# Patient Record
Sex: MALE
[De-identification: names, ages, dates, MRNs, and addresses within clinical notes are randomized per-mention and may not be internally consistent; named-entity substitution may affect disease eponyms.]

---

## 2017-05-18 ENCOUNTER — APPOINTMENT (OUTPATIENT)
Dept: FAMILY MEDICINE | Facility: CLINIC | Age: 27
End: 2017-05-18

## 2017-05-18 VITALS
TEMPERATURE: 98.1 F | WEIGHT: 206 LBS | HEART RATE: 51 BPM | DIASTOLIC BLOOD PRESSURE: 71 MMHG | OXYGEN SATURATION: 96 % | SYSTOLIC BLOOD PRESSURE: 134 MMHG

## 2017-05-19 LAB
25(OH)D3 SERPL-MCNC: 32.9 NG/ML
ALBUMIN SERPL ELPH-MCNC: 4.6 G/DL
ALP BLD-CCNC: 86 U/L
ALT SERPL-CCNC: 28 U/L
ANION GAP SERPL CALC-SCNC: 18 MMOL/L
APPEARANCE: CLEAR
AST SERPL-CCNC: 33 U/L
BACTERIA: NEGATIVE
BASOPHILS # BLD AUTO: 0.01 K/UL
BASOPHILS NFR BLD AUTO: 0.1 %
BILIRUB SERPL-MCNC: 0.4 MG/DL
BILIRUBIN URINE: NEGATIVE
BLOOD URINE: NEGATIVE
BUN SERPL-MCNC: 19 MG/DL
CALCIUM SERPL-MCNC: 9.3 MG/DL
CHLORIDE SERPL-SCNC: 101 MMOL/L
CHOLEST SERPL-MCNC: 138 MG/DL
CHOLEST/HDLC SERPL: 3.4 RATIO
CO2 SERPL-SCNC: 22 MMOL/L
COLOR: YELLOW
CREAT SERPL-MCNC: 1.14 MG/DL
EOSINOPHIL # BLD AUTO: 0.09 K/UL
EOSINOPHIL NFR BLD AUTO: 1 %
GLUCOSE QUALITATIVE U: NORMAL MG/DL
GLUCOSE SERPL-MCNC: 91 MG/DL
HCT VFR BLD CALC: 47.3 %
HDLC SERPL-MCNC: 41 MG/DL
HGB BLD-MCNC: 15.3 G/DL
HYALINE CASTS: 0 /LPF
IMM GRANULOCYTES NFR BLD AUTO: 0.1 %
KETONES URINE: NEGATIVE
LDLC SERPL CALC-MCNC: 85 MG/DL
LEUKOCYTE ESTERASE URINE: NEGATIVE
LYMPHOCYTES # BLD AUTO: 1.91 K/UL
LYMPHOCYTES NFR BLD AUTO: 20.9 %
MAN DIFF?: NORMAL
MCHC RBC-ENTMCNC: 31.4 PG
MCHC RBC-ENTMCNC: 32.3 GM/DL
MCV RBC AUTO: 96.9 FL
MICROSCOPIC-UA: NORMAL
MONOCYTES # BLD AUTO: 0.53 K/UL
MONOCYTES NFR BLD AUTO: 5.8 %
NEUTROPHILS # BLD AUTO: 6.59 K/UL
NEUTROPHILS NFR BLD AUTO: 72.1 %
NITRITE URINE: NEGATIVE
PH URINE: 8.5
PLATELET # BLD AUTO: 302 K/UL
POTASSIUM SERPL-SCNC: 4.5 MMOL/L
PROT SERPL-MCNC: 7.2 G/DL
PROTEIN URINE: ABNORMAL MG/DL
RBC # BLD: 4.88 M/UL
RBC # FLD: 13.6 %
RED BLOOD CELLS URINE: 2 /HPF
SODIUM SERPL-SCNC: 141 MMOL/L
SPECIFIC GRAVITY URINE: 1.03
SQUAMOUS EPITHELIAL CELLS: 1 /HPF
TRIGL SERPL-MCNC: 59 MG/DL
TSH SERPL-ACNC: 2.02 UIU/ML
UROBILINOGEN URINE: NORMAL MG/DL
WBC # FLD AUTO: 9.14 K/UL
WHITE BLOOD CELLS URINE: 0 /HPF

## 2017-05-22 LAB — TESTOST SERPL-MCNC: 218.5 NG/DL

## 2017-06-22 ENCOUNTER — RESULT CHARGE (OUTPATIENT)
Age: 27
End: 2017-06-22

## 2017-06-23 ENCOUNTER — APPOINTMENT (OUTPATIENT)
Dept: FAMILY MEDICINE | Facility: CLINIC | Age: 27
End: 2017-06-23

## 2017-06-23 VITALS
TEMPERATURE: 97.6 F | BODY MASS INDEX: 29.47 KG/M2 | OXYGEN SATURATION: 97 % | DIASTOLIC BLOOD PRESSURE: 67 MMHG | HEIGHT: 69 IN | HEART RATE: 60 BPM | WEIGHT: 199 LBS | SYSTOLIC BLOOD PRESSURE: 126 MMHG

## 2017-10-02 ENCOUNTER — APPOINTMENT (OUTPATIENT)
Dept: FAMILY MEDICINE | Facility: CLINIC | Age: 27
End: 2017-10-02
Payer: COMMERCIAL

## 2017-10-02 VITALS
OXYGEN SATURATION: 96 % | HEIGHT: 69 IN | SYSTOLIC BLOOD PRESSURE: 125 MMHG | WEIGHT: 196 LBS | HEART RATE: 59 BPM | BODY MASS INDEX: 29.03 KG/M2 | TEMPERATURE: 97.9 F | DIASTOLIC BLOOD PRESSURE: 75 MMHG

## 2017-10-02 PROCEDURE — 36415 COLL VENOUS BLD VENIPUNCTURE: CPT

## 2017-10-02 PROCEDURE — 99213 OFFICE O/P EST LOW 20 MIN: CPT | Mod: 25

## 2017-10-03 LAB
TESTOST SERPL-MCNC: 254.8 NG/DL
TSH SERPL-ACNC: 2.13 UIU/ML

## 2017-12-05 ENCOUNTER — APPOINTMENT (OUTPATIENT)
Dept: FAMILY MEDICINE | Facility: CLINIC | Age: 27
End: 2017-12-05

## 2019-01-03 ENCOUNTER — APPOINTMENT (OUTPATIENT)
Dept: FAMILY MEDICINE | Facility: CLINIC | Age: 29
End: 2019-01-03

## 2019-03-26 ENCOUNTER — APPOINTMENT (OUTPATIENT)
Dept: FAMILY MEDICINE | Facility: CLINIC | Age: 29
End: 2019-03-26
Payer: MEDICAID

## 2019-03-26 VITALS
TEMPERATURE: 98.2 F | HEART RATE: 58 BPM | OXYGEN SATURATION: 99 % | SYSTOLIC BLOOD PRESSURE: 110 MMHG | DIASTOLIC BLOOD PRESSURE: 61 MMHG | HEIGHT: 69 IN | BODY MASS INDEX: 26.66 KG/M2 | WEIGHT: 180 LBS

## 2019-03-26 PROCEDURE — 99213 OFFICE O/P EST LOW 20 MIN: CPT

## 2019-03-26 NOTE — HISTORY OF PRESENT ILLNESS
[FreeTextEntry8] : Pt presents requesting to have Testosterone injection given as follow up treatment. Pt has h/o low Testosterone level, was started on Testosterone replacement therapy by Dr Benson with an injection every 3 weeks, last time injection received was probably 6 weeks ago due to Dr Benson moving his practice. Pt offers no acute complains, states he still has some depression issues and would like to seek help. Pt denies any CP, palpitations, dizziness.

## 2019-03-26 NOTE — ASSESSMENT
[FreeTextEntry1] : Pt brought his own Testosterone vial, 1 ML IM applied to the LEFT Deltoid, no complications. I have told the patient that he will need to either continue to see Dr Benson for injections or follow up with an Endocrinology as he needs close follow up with Testosterone replacement therapy. He opted for Endocrinology follow up, a referral to see Dr Luevano has been given to the patient. \par The patient will also get a referral for Mental [facility] Counsellor here at the office to help him with his episodes of depression.\par If the patient decides to continue with us as his primary, it has been recommended to schedule a CPE.

## 2019-03-26 NOTE — PHYSICAL EXAM
[No Acute Distress] : no acute distress [Well Nourished] : well nourished [Well Developed] : well developed [Well-Appearing] : well-appearing [No JVD] : no jugular venous distention [Supple] : supple [No Lymphadenopathy] : no lymphadenopathy [Thyroid Normal, No Nodules] : the thyroid was normal and there were no nodules present [No Respiratory Distress] : no respiratory distress  [Clear to Auscultation] : lungs were clear to auscultation bilaterally [No Accessory Muscle Use] : no accessory muscle use [Normal Rate] : normal rate  [Regular Rhythm] : with a regular rhythm [Normal S1, S2] : normal S1 and S2 [No Murmur] : no murmur heard [Normal Posterior Cervical Nodes] : no posterior cervical lymphadenopathy [Normal Anterior Cervical Nodes] : no anterior cervical lymphadenopathy

## 2019-04-01 ENCOUNTER — APPOINTMENT (OUTPATIENT)
Dept: FAMILY MEDICINE | Facility: CLINIC | Age: 29
End: 2019-04-01

## 2019-04-12 ENCOUNTER — APPOINTMENT (OUTPATIENT)
Dept: FAMILY MEDICINE | Facility: CLINIC | Age: 29
End: 2019-04-12
Payer: MEDICAID

## 2019-04-12 VITALS
SYSTOLIC BLOOD PRESSURE: 112 MMHG | HEIGHT: 69 IN | DIASTOLIC BLOOD PRESSURE: 62 MMHG | OXYGEN SATURATION: 99 % | WEIGHT: 182 LBS | TEMPERATURE: 98.3 F | HEART RATE: 54 BPM | BODY MASS INDEX: 26.96 KG/M2

## 2019-04-12 DIAGNOSIS — H43.399 OTHER VITREOUS OPACITIES, UNSPECIFIED EYE: ICD-10-CM

## 2019-04-12 DIAGNOSIS — Z71.9 COUNSELING, UNSPECIFIED: ICD-10-CM

## 2019-04-12 PROCEDURE — 99214 OFFICE O/P EST MOD 30 MIN: CPT | Mod: 25

## 2019-04-12 PROCEDURE — 36415 COLL VENOUS BLD VENIPUNCTURE: CPT

## 2019-04-12 NOTE — HISTORY OF PRESENT ILLNESS
[FreeTextEntry1] : testosterone shot [de-identified] : Pt presents requesting to have Testosterone injection given as follow up treatment.\par  Pt has h/o low Testosterone level, was started on Testosterone replacement therapy by Dr Marcelino montenegro 1 year ago, with an injection every 3 weeks. States was unable to reach Dr Peace to continue therapy. Pt denies any CP, palpitations, dizziness. or any adverse reaction to therapy.\par Pt states did not notice changes in libido, mood or energy since on testosterone shots.\par reports mood fluctuations, feeling sometimes happy and sometimes very sad.\par Also states has been noticed floaters in visual field. 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

## 2019-04-12 NOTE — ASSESSMENT
[FreeTextEntry1] : 29 y/o M presents today to continue testosteone replacement therapy initiated 1 year ago by Dr Benson, hx Anxiety, OCD;\par \par Long term testosterone therapy:\par -I advise pt risks of therapy outweight benefits, like abnormal LFT's, MI, Hepatocelular carcinoma, Polycythemia.\par -Endocrinology evaluation advise.\par -Testosterone level today with complete blood test.\par \par Mood fluctuations/ Flat affect:\par -mental counselor evaluation.\par \par Visual disorder;\par -Opthalmology evaluation.\par \par Counseling and advise was provided to pt in Hormone therapy. I spend > 20min face to face with pt.\par

## 2019-04-12 NOTE — COUNSELING
[Activity counseling provided] : activity [Healthy eating counseling provided] : healthy eating [Weight management counseling provided] : Weight management [Fall prevention counseling provided] : fall prevention  [Behavioral health counseling provided] : behavioral health  [Needs reinforcement, provided] : Patient needs reinforcement on understanding of disease, goals and obesity follow-up plan; reinforcement was provided [Patient motivation] : Patient motivation [Good understanding] : Patient has a good understanding of lifestyle changes and the steps needed to achieve self management goals [None] : None

## 2019-04-12 NOTE — HEALTH RISK ASSESSMENT
[No falls in past year] : Patient reported no falls in the past year [0] : 2) Feeling down, depressed, or hopeless: Not at all (0) [] : No [FreeTextEntry1] : fluctuating mood

## 2019-04-17 ENCOUNTER — APPOINTMENT (OUTPATIENT)
Dept: ENDOCRINOLOGY | Facility: CLINIC | Age: 29
End: 2019-04-17
Payer: MEDICAID

## 2019-04-17 VITALS
HEART RATE: 57 BPM | DIASTOLIC BLOOD PRESSURE: 80 MMHG | WEIGHT: 183 LBS | HEIGHT: 69 IN | SYSTOLIC BLOOD PRESSURE: 120 MMHG | BODY MASS INDEX: 27.11 KG/M2

## 2019-04-17 DIAGNOSIS — F52.21 MALE ERECTILE DISORDER: ICD-10-CM

## 2019-04-17 PROCEDURE — 99204 OFFICE O/P NEW MOD 45 MIN: CPT

## 2019-04-17 NOTE — HISTORY OF PRESENT ILLNESS
[FreeTextEntry1] : referred here by PCP for testosterone therapy \par he was started on testost 1 yr ago \par last shot of testost was 4 weeks ago 200 mg im every 2 weeks . No level checked on this dose \par he goes to gym 4 x week 2 hr \par he is a student at [employer] \par he used to take from [employer] supplements in 2014 with a " banned substance" \par he felt depressed after

## 2019-04-17 NOTE — PHYSICAL EXAM
[No Acute Distress] : no acute distress [Alert] : alert [Normal Sclera/Conjunctiva] : normal sclera/conjunctiva [Well Nourished] : well nourished [Well Developed] : well developed [No Proptosis] : no proptosis [Normal Oropharynx] : the oropharynx was normal [EOMI] : extra ocular movement intact [No Thyroid Nodules] : there were no palpable thyroid nodules [Thyroid Not Enlarged] : the thyroid was not enlarged [No Accessory Muscle Use] : no accessory muscle use [Clear to Auscultation] : lungs were clear to auscultation bilaterally [No Respiratory Distress] : no respiratory distress [Normal S1, S2] : normal S1 and S2 [Normal Rate] : heart rate was normal  [Regular Rhythm] : with a regular rhythm [No Edema] : there was no peripheral edema [Pedal Pulses Normal] : the pedal pulses are present [Not Tender] : non-tender [Normal Bowel Sounds] : normal bowel sounds [Soft] : abdomen soft [Not Distended] : not distended [Anterior Cervical Nodes] : anterior cervical nodes [Post Cervical Nodes] : posterior cervical nodes [Normal] : normal and non tender [Spine Straight] : spine straight [No Stigmata of Cushings Syndrome] : no stigmata of cushings syndrome [Normal Gait] : normal gait [No Rash] : no rash [Normal Strength/Tone] : muscle strength and tone were normal [Normal Reflexes] : deep tendon reflexes were 2+ and symmetric [Oriented x3] : oriented to person, place, and time [No Tremors] : no tremors [Acanthosis Nigricans] : no acanthosis nigricans

## 2019-04-18 LAB
ALBUMIN SERPL ELPH-MCNC: 4.8 G/DL
ALP BLD-CCNC: 81 U/L
ALT SERPL-CCNC: 27 U/L
ANION GAP SERPL CALC-SCNC: 13 MMOL/L
AST SERPL-CCNC: 43 U/L
BASOPHILS # BLD AUTO: 0.02 K/UL
BASOPHILS NFR BLD AUTO: 0.4 %
BILIRUB SERPL-MCNC: 0.5 MG/DL
BUN SERPL-MCNC: 22 MG/DL
CALCIUM SERPL-MCNC: 9.4 MG/DL
CHLORIDE SERPL-SCNC: 104 MMOL/L
CHOLEST SERPL-MCNC: 131 MG/DL
CHOLEST/HDLC SERPL: 2.6 RATIO
CO2 SERPL-SCNC: 25 MMOL/L
CREAT SERPL-MCNC: 0.94 MG/DL
EOSINOPHIL # BLD AUTO: 0.14 K/UL
EOSINOPHIL NFR BLD AUTO: 2.5 %
GLUCOSE SERPL-MCNC: 88 MG/DL
HCT VFR BLD CALC: 46.3 %
HDLC SERPL-MCNC: 50 MG/DL
HGB BLD-MCNC: 15.3 G/DL
IMM GRANULOCYTES NFR BLD AUTO: 0.4 %
LDLC SERPL CALC-MCNC: 72 MG/DL
LYMPHOCYTES # BLD AUTO: 1.87 K/UL
LYMPHOCYTES NFR BLD AUTO: 32.7 %
MAN DIFF?: NORMAL
MCHC RBC-ENTMCNC: 32.6 PG
MCHC RBC-ENTMCNC: 33 GM/DL
MCV RBC AUTO: 98.7 FL
MONOCYTES # BLD AUTO: 0.51 K/UL
MONOCYTES NFR BLD AUTO: 8.9 %
NEUTROPHILS # BLD AUTO: 3.15 K/UL
NEUTROPHILS NFR BLD AUTO: 55.1 %
PLATELET # BLD AUTO: 285 K/UL
POTASSIUM SERPL-SCNC: 4.1 MMOL/L
PROT SERPL-MCNC: 6.9 G/DL
RBC # BLD: 4.69 M/UL
RBC # FLD: 12.9 %
SODIUM SERPL-SCNC: 142 MMOL/L
TESTOST BND SERPL-MCNC: 5 PG/ML
TESTOST SERPL-MCNC: 222.8 NG/DL
TRIGL SERPL-MCNC: 43 MG/DL
TSH SERPL-ACNC: 2.74 UIU/ML
WBC # FLD AUTO: 5.71 K/UL

## 2019-04-22 ENCOUNTER — APPOINTMENT (OUTPATIENT)
Dept: FAMILY MEDICINE | Facility: CLINIC | Age: 29
End: 2019-04-22

## 2019-04-22 LAB
ALBUMIN SERPL ELPH-MCNC: 4.8 G/DL
ALP BLD-CCNC: 70 U/L
ALT SERPL-CCNC: 19 U/L
ANION GAP SERPL CALC-SCNC: 15 MMOL/L
AST SERPL-CCNC: 17 U/L
BASOPHILS # BLD AUTO: 0.02 K/UL
BASOPHILS NFR BLD AUTO: 0.4 %
BILIRUB SERPL-MCNC: 0.9 MG/DL
BUN SERPL-MCNC: 21 MG/DL
CALCIUM SERPL-MCNC: 9.8 MG/DL
CHLORIDE SERPL-SCNC: 103 MMOL/L
CO2 SERPL-SCNC: 25 MMOL/L
CREAT SERPL-MCNC: 1 MG/DL
EOSINOPHIL # BLD AUTO: 0.14 K/UL
EOSINOPHIL NFR BLD AUTO: 3 %
ESTRADIOL SERPL-MCNC: 15 PG/ML
FSH SERPL-MCNC: 2.7 IU/L
GLUCOSE SERPL-MCNC: 93 MG/DL
HCT VFR BLD CALC: 46.7 %
HGB BLD-MCNC: 15.6 G/DL
IGF-1 INTERP: NORMAL
IGF-I BLD-MCNC: 186 NG/ML
IMM GRANULOCYTES NFR BLD AUTO: 0.2 %
LH SERPL-ACNC: 4.4 IU/L
LYMPHOCYTES # BLD AUTO: 1.81 K/UL
LYMPHOCYTES NFR BLD AUTO: 38.4 %
MAN DIFF?: NORMAL
MCHC RBC-ENTMCNC: 32.2 PG
MCHC RBC-ENTMCNC: 33.4 GM/DL
MCV RBC AUTO: 96.3 FL
MONOCYTES # BLD AUTO: 0.32 K/UL
MONOCYTES NFR BLD AUTO: 6.8 %
NEUTROPHILS # BLD AUTO: 2.41 K/UL
NEUTROPHILS NFR BLD AUTO: 51.2 %
PLATELET # BLD AUTO: 263 K/UL
POTASSIUM SERPL-SCNC: 4.3 MMOL/L
PROLACTIN SERPL-MCNC: 7.3 NG/ML
PROT SERPL-MCNC: 6.8 G/DL
RBC # BLD: 4.85 M/UL
RBC # FLD: 12.3 %
SODIUM SERPL-SCNC: 143 MMOL/L
WBC # FLD AUTO: 4.71 K/UL

## 2019-04-24 LAB — SHBG SERPL-SCNC: 32 NMOL/L

## 2019-04-25 LAB
TESTOST BND SERPL-MCNC: 8.8 PG/ML
TESTOST SERPL-MCNC: 388.4 NG/DL

## 2019-04-29 ENCOUNTER — APPOINTMENT (OUTPATIENT)
Dept: FAMILY MEDICINE | Facility: CLINIC | Age: 29
End: 2019-04-29

## 2019-04-30 LAB — ANDROSTANOLONE SERPL-MCNC: 36 NG/DL

## 2019-05-01 ENCOUNTER — OUTPATIENT (OUTPATIENT)
Dept: OUTPATIENT SERVICES | Facility: HOSPITAL | Age: 29
LOS: 1 days | End: 2019-05-01

## 2019-05-02 ENCOUNTER — APPOINTMENT (OUTPATIENT)
Dept: FAMILY MEDICINE | Facility: CLINIC | Age: 29
End: 2019-05-02

## 2019-05-14 DIAGNOSIS — Z71.89 OTHER SPECIFIED COUNSELING: ICD-10-CM

## 2019-08-28 ENCOUNTER — APPOINTMENT (OUTPATIENT)
Dept: ENDOCRINOLOGY | Facility: CLINIC | Age: 29
End: 2019-08-28
Payer: MEDICAID

## 2019-08-28 VITALS
HEIGHT: 69 IN | HEART RATE: 60 BPM | WEIGHT: 180 LBS | BODY MASS INDEX: 26.66 KG/M2 | DIASTOLIC BLOOD PRESSURE: 80 MMHG | SYSTOLIC BLOOD PRESSURE: 120 MMHG

## 2019-08-28 PROCEDURE — 99213 OFFICE O/P EST LOW 20 MIN: CPT

## 2019-08-28 NOTE — HISTORY OF PRESENT ILLNESS
[FreeTextEntry1] : he was started on testost 1 yr ago \par last shot of testost was 4 weeks ago 200 mg im every 2 weeks . No level checked on this dose \par he goes to gym 4 x week 2 hr \par he is a student at [employer] \par he used to take from [employer] supplements in 2014 with a " banned substance" \par

## 2019-08-28 NOTE — ASSESSMENT
[FreeTextEntry1] : Testicular hypoGonadism / possible secondary to medical supplements \par check labs today. he did not do it. \par check cmp, cbc , testosterone panel. FSh/lh , PRL\par he complains also for nipple sensitivity at  times but getting  better now. \par I suspect he took supplements with possible aromatase -inhibitors in it from [facility] since he has the habitus of a . \par once supplements stopped testosterone improved \par will call pt with results. \par \par \par

## 2019-08-28 NOTE — PHYSICAL EXAM
[Alert] : alert [No Acute Distress] : no acute distress [Well Nourished] : well nourished [Well Developed] : well developed [Normal Sclera/Conjunctiva] : normal sclera/conjunctiva [EOMI] : extra ocular movement intact [No Proptosis] : no proptosis [Normal Oropharynx] : the oropharynx was normal [Thyroid Not Enlarged] : the thyroid was not enlarged [No Thyroid Nodules] : there were no palpable thyroid nodules [No Respiratory Distress] : no respiratory distress [No Accessory Muscle Use] : no accessory muscle use [Clear to Auscultation] : lungs were clear to auscultation bilaterally [Normal Rate] : heart rate was normal  [Normal S1, S2] : normal S1 and S2 [Regular Rhythm] : with a regular rhythm [Pedal Pulses Normal] : the pedal pulses are present [Normal Bowel Sounds] : normal bowel sounds [No Edema] : there was no peripheral edema [Not Tender] : non-tender [Soft] : abdomen soft [Not Distended] : not distended [Post Cervical Nodes] : posterior cervical nodes [Anterior Cervical Nodes] : anterior cervical nodes [Normal] : normal and non tender [No Stigmata of Cushings Syndrome] : no stigmata of cushings syndrome [Spine Straight] : spine straight [Normal Gait] : normal gait [Normal Strength/Tone] : muscle strength and tone were normal [No Rash] : no rash [Normal Reflexes] : deep tendon reflexes were 2+ and symmetric [No Tremors] : no tremors [Oriented x3] : oriented to person, place, and time [Acanthosis Nigricans] : no acanthosis nigricans

## 2019-09-18 LAB
ALBUMIN SERPL ELPH-MCNC: 4.7 G/DL
ALP BLD-CCNC: 69 U/L
ALT SERPL-CCNC: 46 U/L
ANION GAP SERPL CALC-SCNC: 13 MMOL/L
AST SERPL-CCNC: 21 U/L
BASOPHILS # BLD AUTO: 0.02 K/UL
BASOPHILS NFR BLD AUTO: 0.3 %
BILIRUB SERPL-MCNC: 0.5 MG/DL
BUN SERPL-MCNC: 20 MG/DL
CALCIUM SERPL-MCNC: 9.9 MG/DL
CHLORIDE SERPL-SCNC: 105 MMOL/L
CO2 SERPL-SCNC: 26 MMOL/L
CREAT SERPL-MCNC: 1.08 MG/DL
EOSINOPHIL # BLD AUTO: 0.18 K/UL
EOSINOPHIL NFR BLD AUTO: 3.1 %
ESTRADIOL SERPL-MCNC: 16 PG/ML
FSH SERPL-MCNC: 2 IU/L
GLUCOSE SERPL-MCNC: 94 MG/DL
HCT VFR BLD CALC: 45.8 %
HGB BLD-MCNC: 15 G/DL
IMM GRANULOCYTES NFR BLD AUTO: 0.2 %
LH SERPL-ACNC: 5.2 IU/L
LYMPHOCYTES # BLD AUTO: 2.33 K/UL
LYMPHOCYTES NFR BLD AUTO: 39.6 %
MAN DIFF?: NORMAL
MCHC RBC-ENTMCNC: 30.8 PG
MCHC RBC-ENTMCNC: 32.8 GM/DL
MCV RBC AUTO: 94 FL
MONOCYTES # BLD AUTO: 0.43 K/UL
MONOCYTES NFR BLD AUTO: 7.3 %
NEUTROPHILS # BLD AUTO: 2.92 K/UL
NEUTROPHILS NFR BLD AUTO: 49.5 %
PLATELET # BLD AUTO: 263 K/UL
POTASSIUM SERPL-SCNC: 4.6 MMOL/L
PROT SERPL-MCNC: 6.7 G/DL
RBC # BLD: 4.87 M/UL
RBC # FLD: 12.5 %
SHBG SERPL-SCNC: 20 NMOL/L
SODIUM SERPL-SCNC: 144 MMOL/L
TESTOST BND SERPL-MCNC: 7.9 PG/ML
TESTOST SERPL-MCNC: 270.4 NG/DL
WBC # FLD AUTO: 5.89 K/UL

## 2020-02-14 ENCOUNTER — APPOINTMENT (OUTPATIENT)
Dept: ENDOCRINOLOGY | Facility: CLINIC | Age: 30
End: 2020-02-14

## 2020-05-21 ENCOUNTER — APPOINTMENT (OUTPATIENT)
Dept: FAMILY MEDICINE | Facility: CLINIC | Age: 30
End: 2020-05-21
Payer: MEDICAID

## 2020-05-21 VITALS
OXYGEN SATURATION: 98 % | DIASTOLIC BLOOD PRESSURE: 76 MMHG | TEMPERATURE: 98.1 F | WEIGHT: 166 LBS | HEIGHT: 69 IN | RESPIRATION RATE: 14 BRPM | SYSTOLIC BLOOD PRESSURE: 118 MMHG | BODY MASS INDEX: 24.59 KG/M2 | HEART RATE: 53 BPM

## 2020-05-21 PROCEDURE — 99395 PREV VISIT EST AGE 18-39: CPT | Mod: 25

## 2020-05-21 PROCEDURE — 36415 COLL VENOUS BLD VENIPUNCTURE: CPT

## 2020-05-21 NOTE — REVIEW OF SYSTEMS
[Itching] : itching [Nasal Discharge] : nasal discharge [Sore Throat] : sore throat [Palpitations] : palpitations [Anxiety] : anxiety [Depression] : depression [Negative] : Heme/Lymph

## 2020-05-21 NOTE — HEALTH RISK ASSESSMENT
[Good] : ~his/her~  mood as  good [HIV Test offered] : HIV Test offered [Hepatitis C test offered] : Hepatitis C test offered [Single] : single [None] : None [Feels Safe at Home] : Feels safe at home [Fully functional (using the telephone, shopping, preparing meals, housekeeping, doing laundry, using] : Fully functional and needs no help or supervision to perform IADLs (using the telephone, shopping, preparing meals, housekeeping, doing laundry, using transportation, managing medications and managing finances) [Fully functional (bathing, dressing, toileting, transferring, walking, feeding)] : Fully functional (bathing, dressing, toileting, transferring, walking, feeding) [Smoke Detector] : smoke detector [Carbon Monoxide Detector] : carbon monoxide detector [Seat Belt] :  uses seat belt [Patient/Caregiver not ready to engage] : Patient/Caregiver not ready to engage [Never (0 pts)] : Never (0 points) [No] : In the past 12 months have you used drugs other than those required for medical reasons? No [No falls in past year] : Patient reported no falls in the past year [1] : 1) Little interest or pleasure doing things for several days (1) [3] : 2) Feeling down, depressed, or hopeless for nearly every day (3) [de-identified] : GYM, Running [] : No [Audit-CScore] : 0 [ZXY8Jqkvc] : 4 [WCN8Oxzyb] : 13 [Language] : denies difficulty with language [Change in mental status noted] : No change in mental status noted [Learning/Retaining New Information] : denies difficulty learning/retaining new information [Behavior] : denies difficulty with behavior [Handling Complex Tasks] : denies difficulty handling complex tasks [Reasoning] : denies difficulty with reasoning [Spatial Ability and Orientation] : denies difficulty with spatial ability and orientation [Reports changes in hearing] : Reports no changes in hearing [Reports changes in dental health] : Reports no changes in dental health [Reports changes in vision] : Reports no changes in vision [Safety elements used in home] : no safety elements used in home [Sunscreen] : does not use sunscreen [MammogramComments] : n/a [PapSmearComments] : n/a [ColonoscopyComments] : n/a [BoneDensityComments] : n/a [AdvancecareDate] : 05/20

## 2020-05-21 NOTE — PHYSICAL EXAM
Reason for Call:  Form, our goal is to have forms completed with 72 hours, however, some forms may require a visit or additional information.    Type of letter, form or note:  medical    Who is the form from?: Patient    Where did the form come from: Patient or family brought in       What clinic location was the form placed at?: [facility])    Where the form was placed: 's Box    What number is listed as a contact on the form?: [phone]       Additional comments: None    Call taken on 3/9/2018 at 11:20 AM by Nusrat Barragan         [Normal Posterior Cervical Nodes] : no posterior cervical lymphadenopathy [Normal Anterior Cervical Nodes] : no anterior cervical lymphadenopathy [Normal] : affect was normal and insight and judgment were intact

## 2020-05-21 NOTE — COUNSELING
[Behavioral health counseling provided] : Behavioral health counseling provided [AUDIT-C Screening administered and reviewed] : AUDIT-C Screening administered and reviewed [Potential consequences of obesity discussed] : Potential consequences of obesity discussed [Benefits of weight loss discussed] : Benefits of weight loss discussed [Encouraged to increase physical activity] : Encouraged to increase physical activity [____ min/wk Activity] : [unfilled] min/wk activity [Good understanding] : Patient has a good understanding of lifestyle changes and steps needed to achieve self management goal [Patient motivation] : Patient motivation

## 2020-05-21 NOTE — HISTORY OF PRESENT ILLNESS
[FreeTextEntry1] : Physical exam [de-identified] : This is a 28 y/o male with PMHx significant for Anxiety, Depression, Hypogonadism, OCD presenting to the office for CPE. Pt offers no new complains, denies exposure to Covid-19.

## 2020-05-21 NOTE — ASSESSMENT
[FreeTextEntry1] : This is a 28 y/o male with PMHx significant for Anxiety, Depression, Hypogonadism, OCD presenting to the office for CPE.\par \par PSYCH: h/o Depression, anxiety\par -Start Sertraline 50 mg daily, RTO in 4 weeks for follow up\par -If any suicidal thoughts or intent I have urged the patient to go to the nearest ER\par \par ENDO: Low testosterone / hypogonadism\par -Endocrinology follow up with Dr Luevano whom pt was following last year\par -Will check Testosterone free and total with today's labs\par \par HCM:\par -Fasting labs in house today\par -Covid-19 Ab testing\par -Diet and exercise\par -Hep C and HIV testing verbally consented

## 2020-05-26 LAB
25(OH)D3 SERPL-MCNC: 36.1 NG/ML
ALBUMIN SERPL ELPH-MCNC: 5.1 G/DL
ALP BLD-CCNC: 70 U/L
ALT SERPL-CCNC: 20 U/L
ANION GAP SERPL CALC-SCNC: 19 MMOL/L
APPEARANCE: CLEAR
AST SERPL-CCNC: 22 U/L
BACTERIA: NEGATIVE
BASOPHILS # BLD AUTO: 0.02 K/UL
BASOPHILS NFR BLD AUTO: 0.5 %
BILIRUB SERPL-MCNC: 0.8 MG/DL
BILIRUBIN URINE: NEGATIVE
BLOOD URINE: NORMAL
BUN SERPL-MCNC: 22 MG/DL
CALCIUM SERPL-MCNC: 9.7 MG/DL
CHLORIDE SERPL-SCNC: 103 MMOL/L
CHOLEST SERPL-MCNC: 144 MG/DL
CHOLEST/HDLC SERPL: 2.5 RATIO
CO2 SERPL-SCNC: 21 MMOL/L
COLOR: YELLOW
CREAT SERPL-MCNC: 0.96 MG/DL
EOSINOPHIL # BLD AUTO: 0.07 K/UL
EOSINOPHIL NFR BLD AUTO: 1.9 %
GLUCOSE QUALITATIVE U: NEGATIVE
GLUCOSE SERPL-MCNC: 84 MG/DL
HCT VFR BLD CALC: 45.2 %
HCV AB SER QL: NONREACTIVE
HCV S/CO RATIO: 0.07 S/CO
HDLC SERPL-MCNC: 57 MG/DL
HGB BLD-MCNC: 14.6 G/DL
HIV1+2 AB SPEC QL IA.RAPID: NONREACTIVE
HYALINE CASTS: 3 /LPF
IMM GRANULOCYTES NFR BLD AUTO: 0.3 %
KETONES URINE: NEGATIVE
LDLC SERPL CALC-MCNC: 80 MG/DL
LEUKOCYTE ESTERASE URINE: NEGATIVE
LYMPHOCYTES # BLD AUTO: 1.29 K/UL
LYMPHOCYTES NFR BLD AUTO: 34.7 %
MAN DIFF?: NORMAL
MCHC RBC-ENTMCNC: 31.7 PG
MCHC RBC-ENTMCNC: 32.3 GM/DL
MCV RBC AUTO: 98 FL
MICROSCOPIC-UA: NORMAL
MONOCYTES # BLD AUTO: 0.27 K/UL
MONOCYTES NFR BLD AUTO: 7.3 %
NEUTROPHILS # BLD AUTO: 2.06 K/UL
NEUTROPHILS NFR BLD AUTO: 55.3 %
NITRITE URINE: NEGATIVE
PH URINE: 6
PLATELET # BLD AUTO: 252 K/UL
POTASSIUM SERPL-SCNC: 4.4 MMOL/L
PROT SERPL-MCNC: 6.9 G/DL
PROTEIN URINE: NORMAL
RBC # BLD: 4.61 M/UL
RBC # FLD: 12.5 %
RED BLOOD CELLS URINE: 3 /HPF
SARS-COV-2 IGG SERPL IA-ACNC: <0.1 INDEX
SARS-COV-2 IGG SERPL QL IA: NEGATIVE
SODIUM SERPL-SCNC: 143 MMOL/L
SPECIFIC GRAVITY URINE: 1.03
SQUAMOUS EPITHELIAL CELLS: 0 /HPF
TESTOST BND SERPL-MCNC: 10.6 PG/ML
TESTOST SERPL-MCNC: 520.8 NG/DL
TRIGL SERPL-MCNC: 35 MG/DL
TSH SERPL-ACNC: 1.29 UIU/ML
UROBILINOGEN URINE: NORMAL
WBC # FLD AUTO: 3.72 K/UL
WHITE BLOOD CELLS URINE: 1 /HPF

## 2020-06-18 LAB
ALBUMIN SERPL ELPH-MCNC: 4.7 G/DL
ALP BLD-CCNC: 69 U/L
ALT SERPL-CCNC: 18 U/L
ANION GAP SERPL CALC-SCNC: 13 MMOL/L
AST SERPL-CCNC: 18 U/L
BILIRUB SERPL-MCNC: 0.7 MG/DL
BUN SERPL-MCNC: 20 MG/DL
CALCIUM SERPL-MCNC: 9.5 MG/DL
CHLORIDE SERPL-SCNC: 104 MMOL/L
CO2 SERPL-SCNC: 24 MMOL/L
CREAT SERPL-MCNC: 1.06 MG/DL
ESTRADIOL SERPL-MCNC: 10 PG/ML
FSH SERPL-MCNC: 2 IU/L
GLUCOSE SERPL-MCNC: 87 MG/DL
LH SERPL-ACNC: 6.8 IU/L
POTASSIUM SERPL-SCNC: 4.4 MMOL/L
PROT SERPL-MCNC: 6.6 G/DL
SODIUM SERPL-SCNC: 141 MMOL/L

## 2020-06-19 ENCOUNTER — APPOINTMENT (OUTPATIENT)
Dept: ENDOCRINOLOGY | Facility: CLINIC | Age: 30
End: 2020-06-19

## 2020-06-30 LAB
TESTOST BND SERPL-MCNC: 8.4 PG/ML
TESTOST SERPL-MCNC: 555 NG/DL

## 2020-07-09 ENCOUNTER — APPOINTMENT (OUTPATIENT)
Dept: FAMILY MEDICINE | Facility: CLINIC | Age: 30
End: 2020-07-09
Payer: MEDICAID

## 2020-07-09 VITALS
SYSTOLIC BLOOD PRESSURE: 124 MMHG | HEIGHT: 69 IN | DIASTOLIC BLOOD PRESSURE: 69 MMHG | OXYGEN SATURATION: 98 % | TEMPERATURE: 97.6 F | BODY MASS INDEX: 24.14 KG/M2 | HEART RATE: 53 BPM | WEIGHT: 163 LBS

## 2020-07-09 PROCEDURE — 99213 OFFICE O/P EST LOW 20 MIN: CPT

## 2020-07-09 NOTE — HEALTH RISK ASSESSMENT
[Never (0 pts)] : Never (0 points) [No falls in past year] : Patient reported no falls in the past year [No] : In the past 12 months have you used drugs other than those required for medical reasons? No [1] : 1) Little interest or pleasure doing things for several days (1) [3] : 2) Feeling down, depressed, or hopeless for nearly every day (3) [Audit-CScore] : 0 [] : No [de-identified] : GYM, Running [XQI1Njrfv] : 4 [AZE9Ovgbs] : 13

## 2020-07-09 NOTE — HISTORY OF PRESENT ILLNESS
[FreeTextEntry1] : medication refills. [de-identified] : This is a 30 y/o male with PMHx significant for Anxiety, Depression, Hypogonadism, OCD presenting to the office for medication refills. Pt states that he has started  to get anxiety episodes as well as feeling down. Pt had been started on Sertraline 50 mg with good response, now seems the effect to be "wearing off ". Pt has not been able to follow up with Endo for his Testosterone injections.

## 2020-07-09 NOTE — ASSESSMENT
[FreeTextEntry1] : This is a 28 y/o male with PMHx significant for Anxiety, Depression, Hypogonadism, OCD presenting to the office for CPE.\par \par PSYCH: h/o Depression, anxiety\par -Increase Sertraline to 100 mg daily, RTO in 6 weeks for follow up\par -If any suicidal thoughts or intent I have urged the patient to go to the nearest ER\par \par ENDO: Low testosterone / hypogonadism\par -Endocrinology follow up with Dr Luevano.\par \par HCM:\par -Covid-19 Ab negative\par -Diet and exercise\par -Hep C and HIV testing verbally consented, both non-reactive 5/2020

## 2020-08-04 ENCOUNTER — APPOINTMENT (OUTPATIENT)
Dept: ENDOCRINOLOGY | Facility: CLINIC | Age: 30
End: 2020-08-04
Payer: MEDICAID

## 2020-08-04 VITALS
BODY MASS INDEX: 25.18 KG/M2 | HEIGHT: 69 IN | DIASTOLIC BLOOD PRESSURE: 70 MMHG | SYSTOLIC BLOOD PRESSURE: 110 MMHG | WEIGHT: 170 LBS

## 2020-08-04 PROCEDURE — 99213 OFFICE O/P EST LOW 20 MIN: CPT

## 2020-08-04 RX ORDER — TESTOSTERONE CYPIONATE 200 MG/ML
200 VIAL (ML) INTRAMUSCULAR
Refills: 0 | Status: DISCONTINUED | COMMUNITY
Start: 2019-03-26 | End: 2020-08-04

## 2020-08-04 NOTE — PHYSICAL EXAM
[Alert] : alert [Well Nourished] : well nourished [No Acute Distress] : no acute distress [Normal Sclera/Conjunctiva] : normal sclera/conjunctiva [Thyroid Not Enlarged] : the thyroid was not enlarged [Normal Hearing] : hearing was normal [Clear to Auscultation] : lungs were clear to auscultation bilaterally [No Accessory Muscle Use] : no accessory muscle use [Normal Rate] : heart rate was normal [Normal S1, S2] : normal S1 and S2 [Gynecomastia] : gynecomastia present [No Edema] : no peripheral edema [Normal Gait] : normal gait [Not Tender] : non-tender [Soft] : abdomen soft [No Rash] : no rash [No Tremors] : no tremors [Oriented x3] : oriented to person, place, and time [de-identified] : +hardness at nipple areas

## 2020-08-04 NOTE — REVIEW OF SYSTEMS
[Fatigue] : fatigue [Headaches] : headaches [Dizziness] : dizziness [Heat Intolerance] : heat intolerance [Visual Field Defect] : no visual field defect [Blurred Vision] : no blurred vision [Dysphagia] : no dysphagia [Neck Pain] : no neck pain [Chest Pain] : no chest pain [Palpitations] : no palpitations [Dysphonia] : no dysphonia [Shortness Of Breath] : no shortness of breath [Nausea] : no nausea [Vomiting] : no vomiting [Polyuria] : no polyuria [Polydipsia] : no polydipsia

## 2020-08-04 NOTE — HISTORY OF PRESENT ILLNESS
[FreeTextEntry1] : Hypogonadism\par Pt has not been seen in approx 1 year\par \par Was on testosterone injections for a few years up until early 2019\par Now currently on no hormone injections, on MTV and fish oil \par \par C/O fatigue, tenderness in breast tissue \par Also feels he gets lightheaded and dizzy while at work but does not "have time to eat"\par \par Testostoerone levels: Free Testosterone 8.4, Total Testosterone 555\par Estradiol: 10

## 2020-08-04 NOTE — ASSESSMENT
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 Bring snacks to work also. \par \par \par Gynecomastia\par -Palpable hardness at bilateral nipple areas, pt also feels his breast tissue>muscle\par -Breast ultrasound to be done to rule out any "lumps"\par \par Labs again in 3 months, first thing in the AM\par RTO 3 months

## 2020-08-27 ENCOUNTER — APPOINTMENT (OUTPATIENT)
Dept: FAMILY MEDICINE | Facility: CLINIC | Age: 30
End: 2020-08-27
Payer: MEDICAID

## 2020-08-27 VITALS
DIASTOLIC BLOOD PRESSURE: 74 MMHG | HEIGHT: 69 IN | TEMPERATURE: 97.5 F | SYSTOLIC BLOOD PRESSURE: 122 MMHG | OXYGEN SATURATION: 98 % | BODY MASS INDEX: 25.18 KG/M2 | RESPIRATION RATE: 13 BRPM | WEIGHT: 170 LBS | HEART RATE: 66 BPM

## 2020-08-27 PROCEDURE — 99213 OFFICE O/P EST LOW 20 MIN: CPT

## 2020-08-27 NOTE — HEALTH RISK ASSESSMENT
[Never (0 pts)] : Never (0 points) [1] : 1) Little interest or pleasure doing things for several days (1) [No falls in past year] : Patient reported no falls in the past year [No] : In the past 12 months have you used drugs other than those required for medical reasons? No [3] : 2) Feeling down, depressed, or hopeless for nearly every day (3) [] : No [Audit-CScore] : 0 [AZB3Kzmlo] : 13 [POR2Hhftt] : 4 [de-identified] : GYM, Running

## 2020-08-27 NOTE — PHYSICAL EXAM
[Coordination Grossly Intact] : coordination grossly intact [Normal Affect] : the affect was normal [Normal Insight/Judgement] : insight and judgment were intact [de-identified] : G [Normal] : soft, non-tender, non-distended, no masses palpated, no HSM and normal bowel sounds

## 2020-08-27 NOTE — HISTORY OF PRESENT ILLNESS
[de-identified] : This is a 30 y/o male with PMHx significant for Anxiety, Depression, Hypogonadism, OCD presenting to the office for medication refills. Pt states that he has good and bad days while on Sertraline, more good than bad days but definitely better, denies any suicidal ideations. Pt has been following with Endo Dr Luevano for testosterone levels which are higher than they have been. [FreeTextEntry1] : follow up

## 2020-08-27 NOTE — COUNSELING
[Sleep ___ hours/day] : Sleep [unfilled] hours/day [Behavioral health counseling provided] : Behavioral health counseling provided [Engage in a relaxing activity] : Engage in a relaxing activity [None] : None [Good understanding] : Patient has a good understanding of lifestyle changes and steps needed to achieve self management goal [AUDIT-C Screening administered and reviewed] : AUDIT-C Screening administered and reviewed [Encouraged to increase physical activity] : Encouraged to increase physical activity [____ min/wk Activity] : [unfilled] min/wk activity [Patient motivation] : Patient motivation

## 2020-09-04 ENCOUNTER — LABORATORY RESULT (OUTPATIENT)
Age: 30
End: 2020-09-04

## 2020-11-04 ENCOUNTER — APPOINTMENT (OUTPATIENT)
Dept: ENDOCRINOLOGY | Facility: CLINIC | Age: 30
End: 2020-11-04

## 2020-12-15 ENCOUNTER — RX RENEWAL (OUTPATIENT)
Age: 30
End: 2020-12-15

## 2021-03-23 ENCOUNTER — APPOINTMENT (OUTPATIENT)
Dept: FAMILY MEDICINE | Facility: CLINIC | Age: 31
End: 2021-03-23
Payer: MEDICAID

## 2021-03-23 VITALS
SYSTOLIC BLOOD PRESSURE: 124 MMHG | TEMPERATURE: 97.2 F | WEIGHT: 170 LBS | HEIGHT: 69 IN | DIASTOLIC BLOOD PRESSURE: 70 MMHG | RESPIRATION RATE: 16 BRPM | BODY MASS INDEX: 25.18 KG/M2 | HEART RATE: 74 BPM | OXYGEN SATURATION: 99 %

## 2021-03-23 DIAGNOSIS — Z86.16 PERSONAL HISTORY OF COVID-19: ICD-10-CM

## 2021-03-23 DIAGNOSIS — Z20.822 CONTACT WITH AND (SUSPECTED) EXPOSURE TO COVID-19: ICD-10-CM

## 2021-03-23 DIAGNOSIS — Z87.898 PERSONAL HISTORY OF OTHER SPECIFIED CONDITIONS: ICD-10-CM

## 2021-03-23 DIAGNOSIS — H61.21 IMPACTED CERUMEN, RIGHT EAR: ICD-10-CM

## 2021-03-23 PROCEDURE — 99213 OFFICE O/P EST LOW 20 MIN: CPT

## 2021-03-23 PROCEDURE — 99072 ADDL SUPL MATRL&STAF TM PHE: CPT

## 2021-03-23 NOTE — HEALTH RISK ASSESSMENT
[Never (0 pts)] : Never (0 points) [No] : In the past 12 months have you used drugs other than those required for medical reasons? No [No falls in past year] : Patient reported no falls in the past year [1] : 1) Little interest or pleasure doing things for several days (1) [3] : 2) Feeling down, depressed, or hopeless for nearly every day (3) [] : No [Audit-CScore] : 0 [de-identified] : GYM, Running [GOV3Vgywi] : 4 [SOM7Lbegd] : 13

## 2021-03-23 NOTE — ASSESSMENT
[FreeTextEntry1] : This is a 28 y/o male with PMHx significant for Anxiety, Depression, Hypogonadism, OCD presenting to the office for follow up s/p Covid infection.\par \par ID: s/p Covid infection\par -Recommend Vitamin B-12 OTC supplements, Zinc, Vitamin C\par \par PSYCH: h/o Depression, anxiety\par -Continue Sertraline 100 mg daily\par -Overall reports improvement\par -If any suicidal thoughts or intent I have urged the patient to go to the nearest ER\par \par ENDO: Low testosterone / hypogonadism, Gynecomastia\par -Endocrinology follow up with Dr Luevano.\par -Continue off Testosterone supplements.\par -Breast US ordered\par \par HCM:\par -Diet and exercise\par -Hep C and HIV non-reactive 5/2020.\par

## 2021-03-23 NOTE — REVIEW OF SYSTEMS
[Negative] : Musculoskeletal [FreeTextEntry4] : loss of taste and smell [FreeTextEntry8] : Gynecomastia, breast tenderness

## 2021-03-23 NOTE — PHYSICAL EXAM
[Normal] : normal rate, regular rhythm, normal S1 and S2 and no murmur heard [de-identified] : enlarged breasts L>R,, mildly tender.

## 2021-03-25 ENCOUNTER — NON-APPOINTMENT (OUTPATIENT)
Age: 31
End: 2021-03-25

## 2021-06-10 ENCOUNTER — APPOINTMENT (OUTPATIENT)
Dept: FAMILY MEDICINE | Facility: CLINIC | Age: 31
End: 2021-06-10
Payer: MEDICAID

## 2021-06-10 VITALS
TEMPERATURE: 97.1 F | HEIGHT: 69 IN | OXYGEN SATURATION: 98 % | SYSTOLIC BLOOD PRESSURE: 120 MMHG | HEART RATE: 52 BPM | BODY MASS INDEX: 24.88 KG/M2 | WEIGHT: 168 LBS | DIASTOLIC BLOOD PRESSURE: 80 MMHG | RESPIRATION RATE: 16 BRPM

## 2021-06-10 DIAGNOSIS — Z11.59 ENCOUNTER FOR SCREENING FOR OTHER VIRAL DISEASES: ICD-10-CM

## 2021-06-10 DIAGNOSIS — I45.9 CONDUCTION DISORDER, UNSPECIFIED: ICD-10-CM

## 2021-06-10 DIAGNOSIS — Z11.4 ENCOUNTER FOR SCREENING FOR HUMAN IMMUNODEFICIENCY VIRUS [HIV]: ICD-10-CM

## 2021-06-10 DIAGNOSIS — R45.86 EMOTIONAL LABILITY: ICD-10-CM

## 2021-06-10 PROCEDURE — 99213 OFFICE O/P EST LOW 20 MIN: CPT | Mod: 25

## 2021-06-10 RX ORDER — SERTRALINE HYDROCHLORIDE 100 MG/1
100 TABLET, FILM COATED ORAL
Qty: 30 | Refills: 3 | Status: DISCONTINUED | COMMUNITY
Start: 2020-05-21 | End: 2021-06-10

## 2021-06-14 PROBLEM — Z11.59 NEED FOR HEPATITIS C SCREENING TEST: Status: RESOLVED | Noted: 2020-05-21 | Resolved: 2021-06-14

## 2021-06-14 PROBLEM — R45.86 MOOD AND AFFECT DISTURBANCE: Noted: 2019-04-12

## 2021-06-14 PROBLEM — Z11.4 SCREENING FOR HIV (HUMAN IMMUNODEFICIENCY VIRUS): Status: RESOLVED | Noted: 2020-05-21 | Resolved: 2021-06-14

## 2021-06-14 PROBLEM — I45.9 SKIPPED HEART BEATS: Status: RESOLVED | Noted: 2017-06-23 | Resolved: 2021-06-14

## 2021-06-14 NOTE — ASSESSMENT
[FreeTextEntry1] : This is a 30 y/o male with PMHx significant for Anxiety, Depression, Hypogonadism, OCD presenting to the office for follow up s/p Covid infection.\par \par ID: s/p Covid infection\par -Recommend Vitamin B-12 OTC supplements, Zinc, Vitamin C\par \par PSYCH: h/o Depression, anxiety\par -Start Fluoxetine 20 mg daily, discontinue Sertraline 100 mg daily\par -If any suicidal thoughts or intent I have urged the patient to go to the nearest ER\par -Psychology referral given\par \par ENDO: Low testosterone / hypogonadism, Gynecomastia\par -Endocrinology follow up with Dr Luevano.\par -Will check Testosterone Free and total\par -Continue off Testosterone supplements.\par -Breast US ordered again\par \par HCM:\par -Diet and exercise\par -Hep C and HIV non-reactive 5/2020.\par

## 2021-06-14 NOTE — HISTORY OF PRESENT ILLNESS
[FreeTextEntry8] : This is a 31 y/o male with PMHx significant for Anxiety, Depression, Hypogonadism seen by Endo Dr Luevano, OCD presenting to the office complaining of tender breasts especially LEFT breast, had been given referral for US but never had it done. Pt also c/o LEFT foot pain which is atraumatic. Pt also states that he has been having issues with his mood, "snapping for no apparent reason", he has not been taking any mood stabilizers with h/o anxiety / Depression.

## 2021-06-14 NOTE — HEALTH RISK ASSESSMENT
[Never (0 pts)] : Never (0 points) [No] : In the past 12 months have you used drugs other than those required for medical reasons? No [No falls in past year] : Patient reported no falls in the past year [1] : 1) Little interest or pleasure doing things for several days (1) [3] : 2) Feeling down, depressed, or hopeless for nearly every day (3) [] : No [Audit-CScore] : 0 [de-identified] : GYM, Running [[id number]] : 4 [MOC2Dbrtw] : 13

## 2021-06-14 NOTE — REVIEW OF SYSTEMS
[Joint Pain] : joint pain [Anxiety] : anxiety [Depression] : depression [Negative] : Respiratory [FreeTextEntry9] : breast pain, LEFT foot pain [de-identified] : Mood swings

## 2021-06-14 NOTE — PHYSICAL EXAM
[Normal] : affect was normal and insight and judgment were intact [de-identified] : enlarged breasts L>R, mildly tender to palpation

## 2021-07-01 ENCOUNTER — APPOINTMENT (OUTPATIENT)
Dept: FAMILY MEDICINE | Facility: CLINIC | Age: 31
End: 2021-07-01

## 2021-07-22 LAB
TESTOST BND SERPL-MCNC: 13 PG/ML
TESTOSTERONE TOTAL S: 695 NG/DL

## 2021-07-22 RX ORDER — FLUOXETINE HYDROCHLORIDE 20 MG/1
20 CAPSULE ORAL
Qty: 30 | Refills: 1 | Status: DISCONTINUED | COMMUNITY
Start: 2021-06-10 | End: 2021-07-22

## 2021-10-08 ENCOUNTER — APPOINTMENT (OUTPATIENT)
Dept: ENDOCRINOLOGY | Facility: CLINIC | Age: 31
End: 2021-10-08

## 2021-10-14 ENCOUNTER — APPOINTMENT (OUTPATIENT)
Dept: FAMILY MEDICINE | Facility: CLINIC | Age: 31
End: 2021-10-14

## 2021-11-11 ENCOUNTER — APPOINTMENT (OUTPATIENT)
Dept: FAMILY MEDICINE | Facility: CLINIC | Age: 31
End: 2021-11-11
Payer: MEDICAID

## 2021-11-11 VITALS
TEMPERATURE: 98.4 F | OXYGEN SATURATION: 98 % | WEIGHT: 161 LBS | HEIGHT: 69 IN | HEART RATE: 64 BPM | RESPIRATION RATE: 16 BRPM | SYSTOLIC BLOOD PRESSURE: 120 MMHG | DIASTOLIC BLOOD PRESSURE: 80 MMHG | BODY MASS INDEX: 23.85 KG/M2

## 2021-11-11 DIAGNOSIS — M54.2 CERVICALGIA: ICD-10-CM

## 2021-11-11 DIAGNOSIS — Z23 ENCOUNTER FOR IMMUNIZATION: ICD-10-CM

## 2021-11-11 DIAGNOSIS — M62.830 MUSCLE SPASM OF BACK: ICD-10-CM

## 2021-11-11 DIAGNOSIS — M79.672 PAIN IN LEFT FOOT: ICD-10-CM

## 2021-11-11 DIAGNOSIS — F42.9 OBSESSIVE-COMPULSIVE DISORDER, UNSPECIFIED: ICD-10-CM

## 2021-11-11 PROCEDURE — 99213 OFFICE O/P EST LOW 20 MIN: CPT | Mod: 25

## 2021-11-11 PROCEDURE — 0001A: CPT

## 2021-11-11 NOTE — COUNSELING
[Behavioral health counseling provided] : Behavioral health counseling provided [Sleep ___ hours/day] : Sleep [unfilled] hours/day [Engage in a relaxing activity] : Engage in a relaxing activity [Plan in advance] : Plan in advance [Fall prevention counseling provided] : Fall prevention counseling provided [Adequate lighting] : Adequate lighting [No throw rugs] : No throw rugs [None] : None [Good understanding] : Patient has a good understanding of lifestyle changes and steps needed to achieve self management goal

## 2021-11-18 ENCOUNTER — APPOINTMENT (OUTPATIENT)
Dept: ENDOCRINOLOGY | Facility: CLINIC | Age: 31
End: 2021-11-18
Payer: MEDICAID

## 2021-11-18 VITALS — DIASTOLIC BLOOD PRESSURE: 62 MMHG | OXYGEN SATURATION: 96 % | HEART RATE: 63 BPM | SYSTOLIC BLOOD PRESSURE: 118 MMHG

## 2021-11-18 VITALS — BODY MASS INDEX: 23.85 KG/M2 | HEIGHT: 69 IN | WEIGHT: 161 LBS

## 2021-11-18 PROCEDURE — 99214 OFFICE O/P EST MOD 30 MIN: CPT

## 2021-11-18 NOTE — PHYSICAL EXAM
[Alert] : alert [Well Nourished] : well nourished [No Acute Distress] : no acute distress [Well Developed] : well developed [Normal Sclera/Conjunctiva] : normal sclera/conjunctiva [EOMI] : extra ocular movement intact [No Proptosis] : no proptosis [Normal Oropharynx] : the oropharynx was normal [Thyroid Not Enlarged] : the thyroid was not enlarged [No Thyroid Nodules] : no palpable thyroid nodules [No Respiratory Distress] : no respiratory distress [No Accessory Muscle Use] : no accessory muscle use [Clear to Auscultation] : lungs were clear to auscultation bilaterally [Normal S1, S2] : normal S1 and S2 [Normal Rate] : heart rate was normal [Regular Rhythm] : with a regular rhythm [No Edema] : no peripheral edema [Pedal Pulses Normal] : the pedal pulses are present [Normal Bowel Sounds] : normal bowel sounds [Not Tender] : non-tender [Not Distended] : not distended [Soft] : abdomen soft [Normal Anterior Cervical Nodes] : no anterior cervical lymphadenopathy [Normal Posterior Cervical Nodes] : no posterior cervical lymphadenopathy [Normal Reflexes] : deep tendon reflexes were 2+ and symmetric [No Tremors] : no tremors [Oriented x3] : oriented to person, place, and time [Acanthosis Nigricans] : no acanthosis nigricans

## 2021-11-18 NOTE — ASSESSMENT
[FreeTextEntry1] : Testicular hypoGonadism / possible secondary to medical supplements \par testost level good in July\par will await new T level and will call him w results.\par check cmp, cbc , testosterone panel. FSh/lh , PRL\par he complains also for nipple sensitivity at  times but getting  better now. Better since stopped  aromatase -inhibitors from [facility]. \par low libido  : advsied to get psychiatrist and maybe change SSRI to a better agent for his OCD \par \par \par  [Exercise/Effect on Glucose] : exercise/effect on glucose

## 2021-11-18 NOTE — HISTORY OF PRESENT ILLNESS
[FreeTextEntry1] : he was started on testost 2 yr ago \par he goes to gym 4 x week 2 hr \par he is a student at [employer] \par he stopped [employer] supplement. \par

## 2021-11-23 NOTE — PLAN
[FreeTextEntry1] : \par \par \par \par \par 1st pfizer vaccine administered today on left deltoid, well tolerated, no s/s acute reactions. educational pamphlet provided, Pt inst. to wait 15 min. post vaccination. Pt verbalizes understanding. MIGNON Llanos RN.

## 2021-11-23 NOTE — HISTORY OF PRESENT ILLNESS
[FreeTextEntry1] : follow up. [de-identified] : This is a 29 y/o male with PMHx significant for Anxiety, Depression, Hypogonadism seen by J Luis Luevano, OCD presenting to the office for follow up, Covid vaccine and c/o neck pain / stiffness.

## 2021-11-23 NOTE — PHYSICAL EXAM
[Normal] : affect was normal and insight and judgment were intact [de-identified] : enlarged breasts L>R, mildly tender to palpation [de-identified] : Increase tone of paraspinal

## 2021-11-23 NOTE — REVIEW OF SYSTEMS
[Joint Pain] : joint pain [Anxiety] : anxiety [Depression] : depression [Negative] : Respiratory [FreeTextEntry9] : breast pain, LEFT foot big toe pain, neck pain [de-identified] : Mood swings

## 2021-11-23 NOTE — HEALTH RISK ASSESSMENT
[Never (0 pts)] : Never (0 points) [No] : In the past 12 months have you used drugs other than those required for medical reasons? No [No falls in past year] : Patient reported no falls in the past year [1] : 1) Little interest or pleasure doing things for several days (1) [3] : 2) Feeling down, depressed, or hopeless for nearly every day (3) [Patient/Caregiver unclear of wishes] : , patient/caregiver unclear of wishes [] : No [Audit-CScore] : 0 [de-identified] : GYM, Running [CKL1Hjrah] : 4 [AdvancecareDate] : 11/21

## 2021-11-23 NOTE — ASSESSMENT
[FreeTextEntry1] : This is a 28 y/o male with PMHx significant for Anxiety, Depression, Hypogonadism, OCD, Covid infection with no complications, presenting to the office for follow, neck and back pain.\par \par PSYCH: h/o Depression, anxiety\par -Doing well on Fluoxetine 20 mg daily.\par -If any suicidal thoughts or intent I have urged the patient to go to the nearest ER\par -Psychology referral given once again.\par \par MSK: Neck pain, back spasm, LEFT big toe pain\par -LEFT big toe xray\par -Continue Ibuprofen for neck pain, likely secondary to neck spasm\par \par ENDO: Low testosterone / hypogonadism, Gynecomastia\par -Endocrinology follow up with Dr Luevano.\par -Will check Testosterone Free and total as outpt.\par -Continue off Testosterone supplements.\par -Breast US ordered again\par \par HCM:\par -Diet and exercise\par -Hep C and HIV non-reactive 5/2020.\par -Blood work as outpt\par -Covid vaccine PFIZER #1 dose administered in house, RTO in 3 weeks for second dose.\par -Declines Flu vaccine.\par

## 2021-12-02 ENCOUNTER — APPOINTMENT (OUTPATIENT)
Dept: FAMILY MEDICINE | Facility: CLINIC | Age: 31
End: 2021-12-02

## 2021-12-06 LAB
ALBUMIN SERPL ELPH-MCNC: 5 G/DL
ALP BLD-CCNC: 75 U/L
ALT SERPL-CCNC: 19 U/L
ANION GAP SERPL CALC-SCNC: 17 MMOL/L
AST SERPL-CCNC: 19 U/L
BASOPHILS # BLD AUTO: 0.04 K/UL
BASOPHILS NFR BLD AUTO: 0.8 %
BILIRUB SERPL-MCNC: 0.6 MG/DL
BUN SERPL-MCNC: 21 MG/DL
CALCIUM SERPL-MCNC: 9.9 MG/DL
CHLORIDE SERPL-SCNC: 104 MMOL/L
CHOLEST SERPL-MCNC: 161 MG/DL
CO2 SERPL-SCNC: 22 MMOL/L
CREAT SERPL-MCNC: 0.84 MG/DL
EOSINOPHIL # BLD AUTO: 0.15 K/UL
EOSINOPHIL NFR BLD AUTO: 2.9 %
GLUCOSE SERPL-MCNC: 80 MG/DL
HCT VFR BLD CALC: 43.6 %
HDLC SERPL-MCNC: 69 MG/DL
HGB BLD-MCNC: 14.7 G/DL
IMM GRANULOCYTES NFR BLD AUTO: 0.2 %
LDLC SERPL CALC-MCNC: 85 MG/DL
LYMPHOCYTES # BLD AUTO: 2.05 K/UL
LYMPHOCYTES NFR BLD AUTO: 39.9 %
MAN DIFF?: NORMAL
MCHC RBC-ENTMCNC: 32.6 PG
MCHC RBC-ENTMCNC: 33.7 GM/DL
MCV RBC AUTO: 96.7 FL
MONOCYTES # BLD AUTO: 0.34 K/UL
MONOCYTES NFR BLD AUTO: 6.6 %
NEUTROPHILS # BLD AUTO: 2.55 K/UL
NEUTROPHILS NFR BLD AUTO: 49.6 %
NONHDLC SERPL-MCNC: 93 MG/DL
PLATELET # BLD AUTO: 248 K/UL
POTASSIUM SERPL-SCNC: 4 MMOL/L
PROT SERPL-MCNC: 6.9 G/DL
RBC # BLD: 4.51 M/UL
RBC # FLD: 11.9 %
SODIUM SERPL-SCNC: 143 MMOL/L
TESTOST BND SERPL-MCNC: 9.9 PG/ML
TESTOSTERONE TOTAL S: 505 NG/DL
TRIGL SERPL-MCNC: 35 MG/DL
TSH SERPL-ACNC: 1.57 UIU/ML
WBC # FLD AUTO: 5.14 K/UL

## 2022-01-07 ENCOUNTER — APPOINTMENT (OUTPATIENT)
Dept: FAMILY MEDICINE | Facility: CLINIC | Age: 32
End: 2022-01-07

## 2022-01-13 ENCOUNTER — APPOINTMENT (OUTPATIENT)
Dept: FAMILY MEDICINE | Facility: CLINIC | Age: 32
End: 2022-01-13
Payer: MEDICAID

## 2022-01-13 VITALS
OXYGEN SATURATION: 98 % | BODY MASS INDEX: 24.73 KG/M2 | TEMPERATURE: 97.9 F | HEART RATE: 87 BPM | DIASTOLIC BLOOD PRESSURE: 78 MMHG | HEIGHT: 69 IN | WEIGHT: 167 LBS | SYSTOLIC BLOOD PRESSURE: 124 MMHG | RESPIRATION RATE: 16 BRPM

## 2022-01-13 DIAGNOSIS — Z87.39 PERSONAL HISTORY OF OTHER DISEASES OF THE MUSCULOSKELETAL SYSTEM AND CONNECTIVE TISSUE: ICD-10-CM

## 2022-01-13 PROCEDURE — 99213 OFFICE O/P EST LOW 20 MIN: CPT

## 2022-01-13 NOTE — COUNSELING
[Fall prevention counseling provided] : Fall prevention counseling provided [Adequate lighting] : Adequate lighting [No throw rugs] : No throw rugs [Behavioral health counseling provided] : Behavioral health counseling provided [Sleep ___ hours/day] : Sleep [unfilled] hours/day [Engage in a relaxing activity] : Engage in a relaxing activity [Plan in advance] : Plan in advance [None] : None [Good understanding] : Patient has a good understanding of lifestyle changes and steps needed to achieve self management goal

## 2022-01-20 PROBLEM — Z87.39 HISTORY OF BACK PAIN: Status: RESOLVED | Noted: 2021-12-03 | Resolved: 2022-01-20

## 2022-01-20 NOTE — ASSESSMENT
[FreeTextEntry1] : This is a 30 y/o male with PMHx significant for Anxiety, Depression, Hypogonadism, OCD, Covid infection with no complications, presenting to the office for medication refills.\par \par PSYCH: h/o Depression, anxiety\par -Doing well on Sertraline 100 mg daily, Rx Renewed\par -If any suicidal thoughts or intent I have urged the patient to go to the nearest ER\par -Psychology referral given in the past, has not made appointments.\par \par MSK: Neck pain, back spasm.\par -Continue Ibuprofen for neck pain, likely secondary to neck spasm\par \par ENDO: Low testosterone / hypogonadism, Gynecomastia\par -Endocrinology follow up with Dr Luevano.\par -Last Testosterone check shows level within normal range\par -Continue off Testosterone supplements.\par \par HCM:\par -Diet and exercise\par -Hep C and HIV non-reactive 5/2020.\par -Blood work as outpt\par -Covid vaccine PFIZER 11/11/21, will schedule second dose 1/26/22\par -Declines Flu vaccine.\par

## 2022-01-20 NOTE — HEALTH RISK ASSESSMENT
[Never (0 pts)] : Never (0 points) [No] : In the past 12 months have you used drugs other than those required for medical reasons? No [No falls in past year] : Patient reported no falls in the past year [Never] : Never [1] : 2) Feeling down, depressed, or hopeless for several days (1) [PHQ-2 Positive] : PHQ-2 Positive [Several Days (1)] : 4.) Feeling tired or having little energy? Several days [Not at All (0)] : 8.) Moving or speaking so slowly that other people could have noticed, or the opposite, moving or speaking faster than usual? Not at all [Mild] : severity of depression is mild [Somewhat Difficult] : How difficult have these problems made it for you to do your work, take care of things at home, or get along with people? Somewhat difficult [PHQ-9 Positive] : PHQ-9 Positive [Patient/Caregiver not ready to engage] : , patient/caregiver not ready to engage [Reviewed no changes] : Reviewed, no changes [Audit-CScore] : 0 [de-identified] : GYM, Running [MMA4Xibnj] : 2 [BPC4LkgfjCrdnt] : 3 [AdvancecareDate] : 11/21

## 2022-01-20 NOTE — PHYSICAL EXAM
[Normal] : soft, non-tender, non-distended, no masses palpated, no HSM and normal bowel sounds [de-identified] : enlarged breasts L>R, mildly tender to palpation [de-identified] : Increase tone of paraspinal

## 2022-01-20 NOTE — REVIEW OF SYSTEMS
[Joint Pain] : joint pain [Anxiety] : anxiety [Depression] : depression [Negative] : Psychiatric [FreeTextEntry9] : breast pain, LEFT foot big toe pain, neck pain [de-identified] : Mood swings

## 2022-01-26 ENCOUNTER — APPOINTMENT (OUTPATIENT)
Dept: FAMILY MEDICINE | Facility: CLINIC | Age: 32
End: 2022-01-26
Payer: MEDICAID

## 2022-01-26 VITALS
TEMPERATURE: 98 F | WEIGHT: 167 LBS | DIASTOLIC BLOOD PRESSURE: 70 MMHG | RESPIRATION RATE: 16 BRPM | BODY MASS INDEX: 24.73 KG/M2 | HEIGHT: 69 IN | HEART RATE: 68 BPM | SYSTOLIC BLOOD PRESSURE: 118 MMHG | OXYGEN SATURATION: 98 %

## 2022-01-26 PROCEDURE — 0002A: CPT

## 2022-01-26 NOTE — ADDENDUM
[FreeTextEntry1] : 2nd dose pfizer vacciine adm. today well tolerated, no s/s acute rxs. Educational pamphlet provided. Gurdeep HAGAN

## 2022-02-15 NOTE — HISTORY OF PRESENT ILLNESS
[FreeTextEntry1] : Medication refills. [de-identified] : This is a 32 y/o male with PMHx significant for Anxiety, Depression, Hypogonadism seen by J Luis Luevano, OCD presenting to the office for medication refills. [place] Pregnancy And Lactation Text: This medication is not safe during pregnancy. It is unknown if this medication is excreted in breast milk.

## 2022-05-06 ENCOUNTER — APPOINTMENT (OUTPATIENT)
Dept: FAMILY MEDICINE | Facility: CLINIC | Age: 32
End: 2022-05-06
Payer: MEDICAID

## 2022-05-06 VITALS
WEIGHT: 167 LBS | SYSTOLIC BLOOD PRESSURE: 118 MMHG | OXYGEN SATURATION: 98 % | HEIGHT: 69 IN | DIASTOLIC BLOOD PRESSURE: 76 MMHG | HEART RATE: 58 BPM | RESPIRATION RATE: 16 BRPM | BODY MASS INDEX: 24.73 KG/M2

## 2022-05-06 DIAGNOSIS — Z79.890 HORMONE REPLACEMENT THERAPY: ICD-10-CM

## 2022-05-06 PROCEDURE — 99213 OFFICE O/P EST LOW 20 MIN: CPT | Mod: 25

## 2022-05-10 PROBLEM — Z79.890 LONG-TERM CURRENT USE OF TESTOSTERONE REPLACEMENT THERAPY: Status: ACTIVE | Noted: 2019-04-12

## 2022-05-10 LAB
ESTRADIOL SERPL-MCNC: <5 PG/ML
LH SERPL-ACNC: 22.9 IU/L

## 2022-05-10 NOTE — ASSESSMENT
[FreeTextEntry1] : This is a 30 y/o male with PMHx significant for Anxiety, Depression, Hypogonadism, OCD, Covid infection with no complications, presenting to the office for medication refills.\par \par PSYCH: h/o Depression, anxiety, mood swings\par -Continue Sertraline 100 mg daily.\par -Psychology referral given in the past, never followed up, referral again provided today.\par \par MSK: Neck pain, back spasm.\par -Continue Ibuprofen for neck pain, added Tizanidine 2 mg tab at bedtime only likely secondary to neck spasm\par \par ENDO: Low testosterone / hypogonadism, Gynecomastia\par -Endocrinology follow up with Dr Luevano.\par -Last Testosterone check shows level within normal range, will repeat again levels\par -Check FSH, LH, Estradiol\par -Continue off Testosterone supplements.\par \par HCM:\par -Diet and exercise\par -Hep C and HIV non-reactive 5/2020.\par -Blood work as outpt\par -Covid vaccine PFIZER 11/11/21, will schedule second dose 1/26/22\par -Declines Flu vaccine.\par

## 2022-05-10 NOTE — PHYSICAL EXAM
[Normal] : normal gait, coordination grossly intact, no focal deficits and deep tendon reflexes were 2+ and symmetric [de-identified] : slight nipple enlargement L>R, mildly tender to palpation [de-identified] : Increase tone of paraspinal

## 2022-05-10 NOTE — HEALTH RISK ASSESSMENT
[Never] : Never [Never (0 pts)] : Never (0 points) [No] : In the past 12 months have you used drugs other than those required for medical reasons? No [No falls in past year] : Patient reported no falls in the past year [1] : 2) Feeling down, depressed, or hopeless for several days (1) [PHQ-2 Positive] : PHQ-2 Positive [Several Days (1)] : 4.) Feeling tired or having little energy? Several days [Not at All (0)] : 8.) Moving or speaking so slowly that other people could have noticed, or the opposite, moving or speaking faster than usual? Not at all [Mild] : severity of depression is mild [Somewhat Difficult] : How difficult have these problems made it for you to do your work, take care of things at home, or get along with people? Somewhat difficult [PHQ-9 Positive] : PHQ-9 Positive [Patient/Caregiver not ready to engage] : , patient/caregiver not ready to engage [Reviewed updated] : Reviewed, updated [Aggressive treatment] : aggressive treatment [Audit-CScore] : 0 [de-identified] : GYM, Running [de-identified] : supa [JLG1Vfvel] : 2 [ZMM3EyzvpPczly] : 3 [AdvancecareDate] : 05/22

## 2022-05-10 NOTE — REVIEW OF SYSTEMS
[Anxiety] : anxiety [Depression] : depression [Negative] : Gastrointestinal [de-identified] : Mood swings

## 2022-05-10 NOTE — HISTORY OF PRESENT ILLNESS
[FreeTextEntry1] : Medication refills. [de-identified] : This is a 32 y/o male with PMHx significant for Anxiety, Depression, Hypogonadism seen by J Luis Luevano, OCD presenting to the office for check on his testosterone level. Pt states that his gynecomastia has been worsening. Also would like to have referral for psychotherapy due to his anxiety / depression due to Dysmorphia

## 2022-05-11 LAB — SHBG SERPL-SCNC: 51 NMOL/L

## 2022-05-16 ENCOUNTER — NON-APPOINTMENT (OUTPATIENT)
Age: 32
End: 2022-05-16

## 2022-05-20 LAB
TESTOST FREE SERPL-MCNC: 15.1 PG/ML
TESTOST SERPL-MCNC: 1030 NG/DL

## 2022-06-09 ENCOUNTER — APPOINTMENT (OUTPATIENT)
Dept: ENDOCRINOLOGY | Facility: CLINIC | Age: 32
End: 2022-06-09
Payer: MEDICAID

## 2022-06-09 VITALS
DIASTOLIC BLOOD PRESSURE: 68 MMHG | SYSTOLIC BLOOD PRESSURE: 118 MMHG | WEIGHT: 175 LBS | HEIGHT: 69 IN | BODY MASS INDEX: 25.92 KG/M2

## 2022-06-09 VITALS — OXYGEN SATURATION: 98 % | HEART RATE: 60 BPM

## 2022-06-09 PROCEDURE — 99214 OFFICE O/P EST MOD 30 MIN: CPT

## 2022-06-09 NOTE — ASSESSMENT
[FreeTextEntry1] : Testicular hypoGonadism / possible secondary to medical supplements \par testost level good in July but he started [employer] supplement s\par strongly advsied to stop them, most likely with aromatase \par he wants to work out and for fatigue \par explained the risks of fertility issues  and lowering his internal production \par \par low libido  : decreased SSRI in half on his own and libido is recovering. TReated for  OCD \par \par Overweight:: \par discussed diet and exercise\par encouraged more exercise walking 30 min 3 x week\par Needs to try to have more protein for meals\par \par \par \par

## 2022-06-09 NOTE — HISTORY OF PRESENT ILLNESS
[FreeTextEntry1] : he was started on testost 2 yr ago \par he goes to gym 4 x week 2 hr \par he is a student at [employer] \par he stopped [employer] supplement. \par

## 2022-07-14 ENCOUNTER — NON-APPOINTMENT (OUTPATIENT)
Age: 32
End: 2022-07-14

## 2022-08-11 ENCOUNTER — APPOINTMENT (OUTPATIENT)
Dept: FAMILY MEDICINE | Facility: CLINIC | Age: 32
End: 2022-08-11

## 2022-08-19 ENCOUNTER — APPOINTMENT (OUTPATIENT)
Dept: FAMILY MEDICINE | Facility: CLINIC | Age: 32
End: 2022-08-19

## 2022-08-19 VITALS
BODY MASS INDEX: 23.99 KG/M2 | HEART RATE: 62 BPM | OXYGEN SATURATION: 98 % | DIASTOLIC BLOOD PRESSURE: 78 MMHG | WEIGHT: 162 LBS | RESPIRATION RATE: 12 BRPM | SYSTOLIC BLOOD PRESSURE: 92 MMHG | HEIGHT: 69 IN | TEMPERATURE: 97.2 F

## 2022-08-19 DIAGNOSIS — R45.86 EMOTIONAL LABILITY: ICD-10-CM

## 2022-08-19 DIAGNOSIS — G47.09 OTHER INSOMNIA: ICD-10-CM

## 2022-08-19 PROCEDURE — 99214 OFFICE O/P EST MOD 30 MIN: CPT

## 2022-08-30 ENCOUNTER — RX RENEWAL (OUTPATIENT)
Age: 32
End: 2022-08-30

## 2022-08-31 PROBLEM — R45.86 MOOD SWINGS: Status: ACTIVE | Noted: 2021-06-10

## 2022-08-31 NOTE — REVIEW OF SYSTEMS
[Insomnia] : insomnia [Anxiety] : anxiety [Depression] : depression [Negative] : Gastrointestinal [de-identified] : Mood swings

## 2022-08-31 NOTE — PHYSICAL EXAM
[Normal] : affect was normal and insight and judgment were intact [de-identified] : slight nipple enlargement L>R, mildly tender to palpation

## 2022-08-31 NOTE — HEALTH RISK ASSESSMENT
[Never] : Never [Never (0 pts)] : Never (0 points) [No] : In the past 12 months have you used drugs other than those required for medical reasons? No [No falls in past year] : Patient reported no falls in the past year [1] : 2) Feeling down, depressed, or hopeless for several days (1) [PHQ-2 Positive] : PHQ-2 Positive [Several Days (1)] : 4.) Feeling tired or having little energy? Several days [Not at All (0)] : 8.) Moving or speaking so slowly that other people could have noticed, or the opposite, moving or speaking faster than usual? Not at all [Mild] : severity of depression is mild [Somewhat Difficult] : How difficult have these problems made it for you to do your work, take care of things at home, or get along with people? Somewhat difficult [PHQ-9 Positive] : PHQ-9 Positive [Patient/Caregiver not ready to engage] : , patient/caregiver not ready to engage [Reviewed updated] : Reviewed, updated [Aggressive treatment] : aggressive treatment [Audit-CScore] : 0 [de-identified] : GYM, Running [de-identified] : supa [ZQH0Ryfgk] : 2 [PPS5PxfewGrgjy] : 3 [AdvancecareDate] : 05/22

## 2022-08-31 NOTE — ASSESSMENT
[FreeTextEntry1] : This is a 30 y/o male with PMHx significant for Anxiety, Depression, Hypogonadism, OCD, Covid infection with no complications, presenting to the office c/o insomnia.\par \par NEURO: Insomnia\par -Start Belsomra 10 mg prn, if no improvement may Try Trazodone 50 mg 1-2 tabs prn at bedtime\par \par PSYCH: h/o Depression, anxiety, mood swings\par -Continue Sertraline 100 mg daily, e-renewed.\par -Psychology referral given in the past, never followed up, referral was again provided during last visit.\par \par MSK: Neck pain, back spasm.\par -Continue Ibuprofen for neck pain, Tizanidine 2 mg tab at bedtime only likely secondary to neck spasm\par \par ENDO: Low testosterone / hypogonadism, Gynecomastia\par -Endocrinology follow up with Dr Luevano.\par -Last Testosterone check showed supratherapeutic testosterone level with normal free Testosterone\par -Continue off Testosterone supplements.\par \par HCM:\par -Diet and exercise\par -Hep C and HIV non-reactive 5/2020.\par -Covid vaccine PFIZER 11/11/21, 1/26/22\par -Declines Flu vaccine.\par

## 2022-08-31 NOTE — HISTORY OF PRESENT ILLNESS
[de-identified] : This is a 32 y/o male with PMHx significant for Anxiety, Depression, Hypogonadism seen by Endo Dr Luevano, OCD, presenting to the office for episodes of insomnia x 3 weeks. Pt has tried OTC Melatonin, natural teas and home remedies with no improvements, denies any new depressive / anxiety issues.

## 2022-11-03 ENCOUNTER — APPOINTMENT (OUTPATIENT)
Dept: FAMILY MEDICINE | Facility: CLINIC | Age: 32
End: 2022-11-03

## 2022-11-22 ENCOUNTER — APPOINTMENT (OUTPATIENT)
Dept: FAMILY MEDICINE | Facility: CLINIC | Age: 32
End: 2022-11-22

## 2022-11-22 VITALS
RESPIRATION RATE: 12 BRPM | SYSTOLIC BLOOD PRESSURE: 114 MMHG | HEIGHT: 69 IN | HEART RATE: 56 BPM | DIASTOLIC BLOOD PRESSURE: 80 MMHG | BODY MASS INDEX: 23.7 KG/M2 | WEIGHT: 160 LBS | TEMPERATURE: 98 F | OXYGEN SATURATION: 98 %

## 2022-11-22 DIAGNOSIS — Z11.3 ENCOUNTER FOR SCREENING FOR INFECTIONS WITH A PREDOMINANTLY SEXUAL MODE OF TRANSMISSION: ICD-10-CM

## 2022-11-22 PROCEDURE — 99214 OFFICE O/P EST MOD 30 MIN: CPT

## 2022-11-22 RX ORDER — TRAZODONE HYDROCHLORIDE 50 MG/1
50 TABLET ORAL
Qty: 30 | Refills: 1 | Status: DISCONTINUED | COMMUNITY
Start: 2022-08-19 | End: 2022-11-22

## 2022-11-22 RX ORDER — TIZANIDINE 2 MG/1
2 TABLET ORAL
Qty: 15 | Refills: 1 | Status: DISCONTINUED | COMMUNITY
Start: 2022-05-06 | End: 2022-11-22

## 2022-11-22 RX ORDER — SUVOREXANT 10 MG/1
10 TABLET, FILM COATED ORAL
Qty: 30 | Refills: 1 | Status: DISCONTINUED | COMMUNITY
Start: 2022-08-19 | End: 2022-11-22

## 2022-11-22 NOTE — HISTORY OF PRESENT ILLNESS
[FreeTextEntry1] : Medication refills. [de-identified] : This is a 30 y/o male with PMHx significant for Anxiety, Depression, Hypogonadism seen by J Luis Luevano, OCD presenting to the office for check up.Pt working as factory.\par Lives with family.\par States is in a relation for 2 months and wants to have a baseline STD screening.

## 2022-11-22 NOTE — PHYSICAL EXAM
[Normal] : affect was normal and insight and judgment were intact [de-identified] : slight nipple enlargement L>R, mildly tender to palpation [de-identified] : Increase tone of paraspinal

## 2022-11-22 NOTE — HEALTH RISK ASSESSMENT
[Never] : Never [Never (0 pts)] : Never (0 points) [No] : In the past 12 months have you used drugs other than those required for medical reasons? No [No falls in past year] : Patient reported no falls in the past year [1] : 2) Feeling down, depressed, or hopeless for several days (1) [PHQ-2 Positive] : PHQ-2 Positive [Several Days (1)] : 4.) Feeling tired or having little energy? Several days [Not at All (0)] : 8.) Moving or speaking so slowly that other people could have noticed, or the opposite, moving or speaking faster than usual? Not at all [Mild] : severity of depression is mild [Somewhat Difficult] : How difficult have these problems made it for you to do your work, take care of things at home, or get along with people? Somewhat difficult [PHQ-9 Positive] : PHQ-9 Positive [I have developed a follow-up plan documented below in the note.] : I have developed a follow-up plan documented below in the note. [Patient/Caregiver not ready to engage] : , patient/caregiver not ready to engage [Reviewed updated] : Reviewed, updated [Aggressive treatment] : aggressive treatment [Audit-CScore] : 0 [de-identified] : not at present time [de-identified] : regular [GGP8Vbgxm] : 2 [LUP9LlbxdAndml] : 2 [AdvancecareDate] : 05/22

## 2022-11-22 NOTE — REVIEW OF SYSTEMS
[Anxiety] : anxiety [Depression] : depression [Negative] : Gastrointestinal [de-identified] : Mood swings

## 2022-11-22 NOTE — ASSESSMENT
[FreeTextEntry1] : This is a 32 y/o male with PMHx significant for Anxiety, Depression, Hypogonadism, OCD, Covid infection with no complications, presenting to the office for medication refills.\par \par PSYCH: h/o Depression, anxiety, mood swings\par -Continue Sertraline 100 mg daily.\par -Not interested in counseling\par \par STD screening\par \par MSK: Neck pain, back spasm.\par \par ENDO: Low testosterone / hypogonadism, Gynecomastia\par -Endocrinology follow up with Dr Luevano.\par \par HCM:\par -Diet and exercise\par -Hep C and HIV non-reactive 5/2020.\par -Blood work as outpt\par -Covid vaccine PFIZER 11/11/21, will schedule second dose 1/26/22\par -Declines Flu vaccine.\par

## 2022-11-25 LAB
ALBUMIN SERPL ELPH-MCNC: 4.6 G/DL
ALP BLD-CCNC: 77 U/L
ALT SERPL-CCNC: 16 U/L
ANION GAP SERPL CALC-SCNC: 11 MMOL/L
AST SERPL-CCNC: 14 U/L
BASOPHILS # BLD AUTO: 0.02 K/UL
BASOPHILS NFR BLD AUTO: 0.4 %
BILIRUB SERPL-MCNC: 0.5 MG/DL
BUN SERPL-MCNC: 25 MG/DL
C TRACH RRNA SPEC QL NAA+PROBE: NOT DETECTED
CALCIUM SERPL-MCNC: 9.6 MG/DL
CHLORIDE SERPL-SCNC: 105 MMOL/L
CO2 SERPL-SCNC: 27 MMOL/L
CREAT SERPL-MCNC: 0.86 MG/DL
EGFR: 120 ML/MIN/1.73M2
EOSINOPHIL # BLD AUTO: 0.11 K/UL
EOSINOPHIL NFR BLD AUTO: 2.4 %
GLUCOSE SERPL-MCNC: 88 MG/DL
HBV SURFACE AG SER QL: NONREACTIVE
HCT VFR BLD CALC: 45.3 %
HCV AB SER QL: NONREACTIVE
HCV S/CO RATIO: 0.05 S/CO
HGB BLD-MCNC: 15 G/DL
HIV1+2 AB SPEC QL IA.RAPID: NONREACTIVE
IMM GRANULOCYTES NFR BLD AUTO: 0.2 %
LYMPHOCYTES # BLD AUTO: 1.62 K/UL
LYMPHOCYTES NFR BLD AUTO: 35.8 %
MAN DIFF?: NORMAL
MCHC RBC-ENTMCNC: 32.6 PG
MCHC RBC-ENTMCNC: 33.1 GM/DL
MCV RBC AUTO: 98.5 FL
MONOCYTES # BLD AUTO: 0.29 K/UL
MONOCYTES NFR BLD AUTO: 6.4 %
N GONORRHOEA RRNA SPEC QL NAA+PROBE: NOT DETECTED
NEUTROPHILS # BLD AUTO: 2.47 K/UL
NEUTROPHILS NFR BLD AUTO: 54.8 %
PLATELET # BLD AUTO: 259 K/UL
POTASSIUM SERPL-SCNC: 4.2 MMOL/L
PROT SERPL-MCNC: 6.5 G/DL
RBC # BLD: 4.6 M/UL
RBC # FLD: 12.6 %
SODIUM SERPL-SCNC: 142 MMOL/L
SOURCE AMPLIFICATION: NORMAL
TESTOST SERPL-MCNC: 483 NG/DL
WBC # FLD AUTO: 4.52 K/UL

## 2022-11-28 ENCOUNTER — NON-APPOINTMENT (OUTPATIENT)
Age: 32
End: 2022-11-28

## 2022-12-08 ENCOUNTER — APPOINTMENT (OUTPATIENT)
Dept: ENDOCRINOLOGY | Facility: CLINIC | Age: 32
End: 2022-12-08

## 2023-01-03 ENCOUNTER — APPOINTMENT (OUTPATIENT)
Dept: OPHTHALMOLOGY | Facility: CLINIC | Age: 33
End: 2023-01-03

## 2023-01-18 ENCOUNTER — APPOINTMENT (OUTPATIENT)
Dept: FAMILY MEDICINE | Facility: CLINIC | Age: 33
End: 2023-01-18
Payer: MEDICAID

## 2023-01-18 VITALS
BODY MASS INDEX: 22.81 KG/M2 | RESPIRATION RATE: 16 BRPM | SYSTOLIC BLOOD PRESSURE: 130 MMHG | WEIGHT: 154 LBS | HEART RATE: 79 BPM | TEMPERATURE: 97.9 F | DIASTOLIC BLOOD PRESSURE: 80 MMHG | HEIGHT: 69 IN | OXYGEN SATURATION: 98 %

## 2023-01-18 DIAGNOSIS — F32.A DEPRESSION, UNSPECIFIED: ICD-10-CM

## 2023-01-18 PROCEDURE — 99214 OFFICE O/P EST MOD 30 MIN: CPT | Mod: 25

## 2023-01-18 NOTE — HEALTH RISK ASSESSMENT
[Never] : Never [Never (0 pts)] : Never (0 points) [No] : In the past 12 months have you used drugs other than those required for medical reasons? No [No falls in past year] : Patient reported no falls in the past year [1] : 2) Feeling down, depressed, or hopeless for several days (1) [PHQ-2 Positive] : PHQ-2 Positive [Several Days (1)] : 4.) Feeling tired or having little energy? Several days [Not at All (0)] : 8.) Moving or speaking so slowly that other people could have noticed, or the opposite, moving or speaking faster than usual? Not at all [Mild] : severity of depression is mild [Somewhat Difficult] : How difficult have these problems made it for you to do your work, take care of things at home, or get along with people? Somewhat difficult [PHQ-9 Positive] : PHQ-9 Positive [I have developed a follow-up plan documented below in the note.] : I have developed a follow-up plan documented below in the note. [Patient/Caregiver not ready to engage] : , patient/caregiver not ready to engage [Reviewed updated] : Reviewed, updated [Aggressive treatment] : aggressive treatment [Audit-CScore] : 0 [de-identified] : not at present time [de-identified] : regular [JDP7Hddjo] : 2 [EUY1QpaqgZnzok] : 2 [AdvancecareDate] : 05/22

## 2023-01-18 NOTE — ASSESSMENT
[FreeTextEntry1] : This is a 33 y/o male with Long hxr Anxiety, Depression, OCD, never treated, seen by counselor or Psychiatry inspite of several suggestions.\par \par PSYCH: h/o Depression, anxiety, mood swings\par -Continue Sertraline 100 mg daily.\par - program referral\par \par \par MSK: Neck pain, back spasm.\par \par ENDO: Low testosterone / hypogonadism, Gynecomastia\par -Endocrinology follow up with Dr Luevano.\par -Blood darwn today\par \par HCM:\par -Diet and exercise\par -Hep C and HIV non-reactive 5/2020.\par -Blood work as outpt\par -Covid vaccine PFIZER 11/11/21, will schedule second dose 1/26/22\par -Declines Flu vaccine.\par

## 2023-01-18 NOTE — HISTORY OF PRESENT ILLNESS
[FreeTextEntry1] : Medication refills. 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

## 2023-01-18 NOTE — PHYSICAL EXAM
[Normal] : affect was normal and insight and judgment were intact [de-identified] : slight nipple enlargement L>R, mildly tender to palpation [de-identified] : Increase tone of paraspinal

## 2023-01-18 NOTE — REVIEW OF SYSTEMS
[Anxiety] : anxiety [Depression] : depression [Negative] : Gastrointestinal [de-identified] : Mood swings

## 2023-01-23 LAB
TESTOST FREE SERPL-MCNC: 12 PG/ML
TESTOST SERPL-MCNC: 544 NG/DL

## 2023-01-30 ENCOUNTER — TRANSCRIPTION ENCOUNTER (OUTPATIENT)
Age: 33
End: 2023-01-30

## 2023-02-14 ENCOUNTER — TRANSCRIPTION ENCOUNTER (OUTPATIENT)
Age: 33
End: 2023-02-14

## 2023-02-24 ENCOUNTER — APPOINTMENT (OUTPATIENT)
Dept: ENDOCRINOLOGY | Facility: CLINIC | Age: 33
End: 2023-02-24
Payer: MEDICAID

## 2023-02-24 VITALS
SYSTOLIC BLOOD PRESSURE: 120 MMHG | DIASTOLIC BLOOD PRESSURE: 60 MMHG | TEMPERATURE: 97.2 F | HEIGHT: 69 IN | HEART RATE: 70 BPM | RESPIRATION RATE: 15 BRPM | BODY MASS INDEX: 23.25 KG/M2 | OXYGEN SATURATION: 98 % | WEIGHT: 157 LBS

## 2023-02-24 DIAGNOSIS — R79.89 OTHER SPECIFIED ABNORMAL FINDINGS OF BLOOD CHEMISTRY: ICD-10-CM

## 2023-02-24 DIAGNOSIS — N62 HYPERTROPHY OF BREAST: ICD-10-CM

## 2023-02-24 DIAGNOSIS — E66.3 OVERWEIGHT: ICD-10-CM

## 2023-02-24 PROCEDURE — 99214 OFFICE O/P EST MOD 30 MIN: CPT

## 2023-02-24 NOTE — PHYSICAL EXAM
[Alert] : alert [Well Nourished] : well nourished [No Acute Distress] : no acute distress [Well Developed] : well developed [Normal Sclera/Conjunctiva] : normal sclera/conjunctiva [EOMI] : extra ocular movement intact [No Proptosis] : no proptosis [Normal Oropharynx] : the oropharynx was normal [Thyroid Not Enlarged] : the thyroid was not enlarged [No Thyroid Nodules] : no palpable thyroid nodules [No Respiratory Distress] : no respiratory distress [No Accessory Muscle Use] : no accessory muscle use [Clear to Auscultation] : lungs were clear to auscultation bilaterally [Normal S1, S2] : normal S1 and S2 [Normal Rate] : heart rate was normal [Regular Rhythm] : with a regular rhythm [No Edema] : no peripheral edema [Pedal Pulses Normal] : the pedal pulses are present [Normal Bowel Sounds] : normal bowel sounds [Not Tender] : non-tender [Not Distended] : not distended [Soft] : abdomen soft [Normal Anterior Cervical Nodes] : no anterior cervical lymphadenopathy [No Tremors] : no tremors [Oriented x3] : oriented to person, place, and time [Acanthosis Nigricans] : no acanthosis nigricans [de-identified] : breast exam; Christiano 2, paraareolar nodules palpable B/L , no skin retraction, no nipple discharg e

## 2023-02-24 NOTE — REVIEW OF SYSTEMS
[Negative] : Endocrine [Erectile Dysfunction] : erectile dysfunction [Decreased Libido] : decreased libido

## 2023-02-24 NOTE — ASSESSMENT
[FreeTextEntry1] : Testicular hypoGonadism / possible secondary to medical supplements . he stopped all supplements and still goes to gym1-2 x week .Complains of receding line, ED and lack of libido\par he has a girlfriend. \par \par Lack of libido/ fatigue \par he takes sertraline which is a major contributing to low libido \par advsied to talk to PCP and choose a different agent with less sex side effects \par will check cortisol and ACTH \par \par gynecomastia/ Christiano 2  ; check testost, FSH/LH, estardiol , PRL, TFTS \par check breast US and mammogram since palpable nodules B/L \par \par OCD : followup with psych next week and will address/ \par \par Overweight:: \par discussed diet and exercise\par encouraged more exercise walking 30 min 3 x week\par \par \par \par

## 2023-02-24 NOTE — HISTORY OF PRESENT ILLNESS
[FreeTextEntry1] : he was started on testost 2 yr ago \par he goes to gym 4 x week 2 hr \par he is a student at [employer] \par he stopped [employer] supplement. \par

## 2023-02-28 ENCOUNTER — NON-APPOINTMENT (OUTPATIENT)
Age: 33
End: 2023-02-28

## 2023-02-28 ENCOUNTER — RX RENEWAL (OUTPATIENT)
Age: 33
End: 2023-02-28

## 2023-02-28 LAB
ACTH SER-ACNC: 21.9 PG/ML
ALBUMIN SERPL ELPH-MCNC: 4.4 G/DL
ALP BLD-CCNC: 71 U/L
ALT SERPL-CCNC: 9 U/L
ANION GAP SERPL CALC-SCNC: 10 MMOL/L
AST SERPL-CCNC: 16 U/L
BASOPHILS # BLD AUTO: 0.02 K/UL
BASOPHILS NFR BLD AUTO: 0.4 %
BILIRUB SERPL-MCNC: 0.8 MG/DL
BUN SERPL-MCNC: 19 MG/DL
CALCIUM SERPL-MCNC: 9.1 MG/DL
CHLORIDE SERPL-SCNC: 104 MMOL/L
CO2 SERPL-SCNC: 26 MMOL/L
CORTIS SERPL-MCNC: 15.2 UG/DL
CREAT SERPL-MCNC: 0.96 MG/DL
EGFR: 109 ML/MIN/1.73M2
EOSINOPHIL # BLD AUTO: 0.11 K/UL
EOSINOPHIL NFR BLD AUTO: 2.1 %
ESTRADIOL SERPL-MCNC: 22 PG/ML
FSH SERPL-MCNC: 2.3 IU/L
GLUCOSE SERPL-MCNC: 91 MG/DL
HCT VFR BLD CALC: 42.8 %
HGB BLD-MCNC: 14.5 G/DL
IGF-1 INTERP: NORMAL
IGF-I BLD-MCNC: 54 NG/ML
IMM GRANULOCYTES NFR BLD AUTO: 0.2 %
LYMPHOCYTES # BLD AUTO: 1.4 K/UL
LYMPHOCYTES NFR BLD AUTO: 26.2 %
MAN DIFF?: NORMAL
MCHC RBC-ENTMCNC: 32.3 PG
MCHC RBC-ENTMCNC: 33.9 GM/DL
MCV RBC AUTO: 95.3 FL
MONOCYTES # BLD AUTO: 0.55 K/UL
MONOCYTES NFR BLD AUTO: 10.3 %
NEUTROPHILS # BLD AUTO: 3.25 K/UL
NEUTROPHILS NFR BLD AUTO: 60.8 %
PLATELET # BLD AUTO: 238 K/UL
POTASSIUM SERPL-SCNC: 4 MMOL/L
PROLACTIN SERPL-MCNC: 6.3 NG/ML
PROT SERPL-MCNC: 6.3 G/DL
RBC # BLD: 4.49 M/UL
RBC # FLD: 12.4 %
SHBG SERPL-SCNC: 53.3 NMOL/L
SODIUM SERPL-SCNC: 140 MMOL/L
T4 FREE SERPL-MCNC: 1.1 NG/DL
TSH SERPL-ACNC: 0.72 UIU/ML
WBC # FLD AUTO: 5.34 K/UL

## 2023-03-15 ENCOUNTER — NON-APPOINTMENT (OUTPATIENT)
Age: 33
End: 2023-03-15

## 2023-03-15 ENCOUNTER — APPOINTMENT (OUTPATIENT)
Dept: OPHTHALMOLOGY | Facility: CLINIC | Age: 33
End: 2023-03-15
Payer: MEDICAID

## 2023-03-15 PROCEDURE — 92004 COMPRE OPH EXAM NEW PT 1/>: CPT

## 2023-03-17 ENCOUNTER — APPOINTMENT (OUTPATIENT)
Dept: FAMILY MEDICINE | Facility: CLINIC | Age: 33
End: 2023-03-17
Payer: MEDICAID

## 2023-03-17 VITALS
WEIGHT: 162 LBS | RESPIRATION RATE: 16 BRPM | OXYGEN SATURATION: 98 % | DIASTOLIC BLOOD PRESSURE: 70 MMHG | HEART RATE: 64 BPM | SYSTOLIC BLOOD PRESSURE: 110 MMHG | BODY MASS INDEX: 23.99 KG/M2 | HEIGHT: 69 IN | TEMPERATURE: 97.6 F

## 2023-03-17 DIAGNOSIS — R68.82 DECREASED LIBIDO: ICD-10-CM

## 2023-03-17 DIAGNOSIS — R94.7 ABNORMAL RESULTS OF OTHER ENDOCRINE FUNCTION STUDIES: ICD-10-CM

## 2023-03-17 DIAGNOSIS — F41.9 ANXIETY DISORDER, UNSPECIFIED: ICD-10-CM

## 2023-03-17 DIAGNOSIS — H02.403 UNSPECIFIED PTOSIS OF BILATERAL EYELIDS: ICD-10-CM

## 2023-03-17 PROCEDURE — 99214 OFFICE O/P EST MOD 30 MIN: CPT | Mod: 25

## 2023-03-17 NOTE — ASSESSMENT
[FreeTextEntry1] : This is a 31 y/o male with Long hxr Anxiety, Depression, OCD, never treated, seen by counselor or Psychiatry inspite of several suggestions.\par \par B/L Ptosis:\par -Seen by Ophthalmology\par -Pending CT orbit\par \par PSYCH: h/o Depression, anxiety, mood swings\par -He d/c Sertraline 100 mg daily.\par - program referral again\par \par \par MSK: Neck pain, back spasm.\par \par ENDO: Low testosterone / hypogonadism, Gynecomastia/ ED\par -Endocrinology follow up with Dr Luevano.\par \par HCM:\par -Diet and exercise\par -Hep C and HIV non-reactive 5/2020.\par -Lipid profile today\par -Covid vaccine PFIZER 11/11/21, will schedule second dose 1/26/22\par -Declines Flu vaccine.\par

## 2023-03-17 NOTE — REVIEW OF SYSTEMS
[Anxiety] : anxiety [Depression] : depression [Negative] : Gastrointestinal [Impotence] : impotence [FreeTextEntry3] : Ptosis B/L [de-identified] : Mood swings

## 2023-03-17 NOTE — PHYSICAL EXAM
[Normal] : affect was normal and insight and judgment were intact [de-identified] : slight nipple enlargement L>R, mildly tender to palpation [de-identified] : Increase tone of paraspinal

## 2023-03-17 NOTE — HISTORY OF PRESENT ILLNESS
[FreeTextEntry1] : Medication refills. [de-identified] : This is a 33 y/o male with PMHx significant for Anxiety, Depression, Hypogonadism seen in the past by Endo Dr Luevano, OCD presenting to the office for f/up.\par He is concern about E.D.\par He states has B/L Ptosis for long time> seen recently by Opthalmology> pending CT orbit order.\par Lives with family.\par States is in a relation for > 2 months, and states has problems with erection.\par Long hx Anxiety is not well control. He d/c sertraline. States he recently started counseling weekly.\par

## 2023-03-17 NOTE — HEALTH RISK ASSESSMENT
[Never (0 pts)] : Never (0 points) [No] : In the past 12 months have you used drugs other than those required for medical reasons? No [No falls in past year] : Patient reported no falls in the past year [1] : 2) Feeling down, depressed, or hopeless for several days (1) [PHQ-2 Positive] : PHQ-2 Positive [Several Days (1)] : 4.) Feeling tired or having little energy? Several days [Not at All (0)] : 8.) Moving or speaking so slowly that other people could have noticed, or the opposite, moving or speaking faster than usual? Not at all [Mild] : severity of depression is mild [Somewhat Difficult] : How difficult have these problems made it for you to do your work, take care of things at home, or get along with people? Somewhat difficult [PHQ-9 Positive] : PHQ-9 Positive [I have developed a follow-up plan documented below in the note.] : I have developed a follow-up plan documented below in the note. [Patient/Caregiver not ready to engage] : , patient/caregiver not ready to engage [Reviewed updated] : Reviewed, updated [Aggressive treatment] : aggressive treatment [Never] : Never [Audit-CScore] : 0 [de-identified] : not at present time [de-identified] : regular [[id number]] : 2 [[id number]] : 2 [AdvancecareDate] : 05/22

## 2023-03-18 LAB
CHOLEST SERPL-MCNC: 137 MG/DL
HDLC SERPL-MCNC: 62 MG/DL
LDLC SERPL CALC-MCNC: 65 MG/DL
NONHDLC SERPL-MCNC: 75 MG/DL
TRIGL SERPL-MCNC: 50 MG/DL

## 2023-03-21 ENCOUNTER — NON-APPOINTMENT (OUTPATIENT)
Age: 33
End: 2023-03-21

## 2023-03-22 LAB
IGF-1 INTERP: NORMAL
IGF-I BLD-MCNC: 79 NG/ML
TESTOST FREE SERPL-MCNC: 19.4 PG/ML
TESTOST SERPL-MCNC: 788 NG/DL

## 2023-04-04 ENCOUNTER — TRANSCRIPTION ENCOUNTER (OUTPATIENT)
Age: 33
End: 2023-04-04

## 2023-04-05 ENCOUNTER — APPOINTMENT (OUTPATIENT)
Dept: OPHTHALMOLOGY | Facility: CLINIC | Age: 33
End: 2023-04-05

## 2023-06-09 ENCOUNTER — APPOINTMENT (OUTPATIENT)
Dept: FAMILY MEDICINE | Facility: CLINIC | Age: 33
End: 2023-06-09

## 2023-07-18 ENCOUNTER — APPOINTMENT (OUTPATIENT)
Dept: FAMILY MEDICINE | Facility: CLINIC | Age: 33
End: 2023-07-18
Payer: MEDICAID

## 2023-07-18 VITALS
BODY MASS INDEX: 22.81 KG/M2 | SYSTOLIC BLOOD PRESSURE: 120 MMHG | TEMPERATURE: 98.1 F | OXYGEN SATURATION: 99 % | WEIGHT: 154 LBS | DIASTOLIC BLOOD PRESSURE: 76 MMHG | RESPIRATION RATE: 15 BRPM | HEART RATE: 53 BPM | HEIGHT: 69 IN

## 2023-07-18 DIAGNOSIS — R06.83 SNORING: ICD-10-CM

## 2023-07-18 DIAGNOSIS — Z00.00 ENCOUNTER FOR GENERAL ADULT MEDICAL EXAMINATION W/OUT ABNORMAL FINDINGS: ICD-10-CM

## 2023-07-18 PROCEDURE — 99395 PREV VISIT EST AGE 18-39: CPT | Mod: 25

## 2023-07-18 NOTE — REVIEW OF SYSTEMS
[Fatigue] : fatigue [Vision Problems] : vision problems [Insomnia] : insomnia [Depression] : depression [Negative] : Heme/Lymph

## 2023-07-18 NOTE — HEALTH RISK ASSESSMENT
[Good] : ~his/her~ current health as good [Fair] :  ~his/her~ mood as fair [Never (0 pts)] : Never (0 points) [No] : In the past 12 months have you used drugs other than those required for medical reasons? No [No falls in past year] : Patient reported no falls in the past year [1] : 1) Little interest or pleasure doing things for several days (1) [3] : 2) Feeling down, depressed, or hopeless for nearly every day (3) [PHQ-2 Positive] : PHQ-2 Positive [Several Days (1)] : 1.) Little interest or pleasure in doing things? Several days [Nearly Every Day (3)] : 6.) Feeling bad about yourself, or that you are a failure, or have let yourself or your family down? Nearly every day [Not at All (0)] : 8.) Moving or speaking so slowly that other people could have noticed, or the opposite, moving or speaking faster than usual? Not at all [Moderately Severe] : severity of depression is moderately severe [Somewhat Difficult] : How difficult have these problems made it for you to do your work, take care of things at home, or get along with people? Somewhat difficult [HIV test declined] : HIV test declined [Hepatitis C test declined] : Hepatitis C test declined [None] : None [# of Members in Household ___] :  household currently consist of [unfilled] member(s) [Employed] : employed [Single] : single [Sexually Active] : sexually active [Feels Safe at Home] : Feels safe at home [Fully functional (bathing, dressing, toileting, transferring, walking, feeding)] : Fully functional (bathing, dressing, toileting, transferring, walking, feeding) [Fully functional (using the telephone, shopping, preparing meals, housekeeping, doing laundry, using] : Fully functional and needs no help or supervision to perform IADLs (using the telephone, shopping, preparing meals, housekeeping, doing laundry, using transportation, managing medications and managing finances) [Smoke Detector] : smoke detector [Carbon Monoxide Detector] : carbon monoxide detector [Seat Belt] :  uses seat belt [With Patient/Caregiver] : , with patient/caregiver [Reviewed updated] : Reviewed, updated [Aggressive treatment] : aggressive treatment [Never] : Never [Audit-CScore] : 0 [de-identified] : GYM, Running [de-identified] : regular [[id number]] : 4 [[id number]] : 16 [Change in mental status noted] : No change in mental status noted [Language] : denies difficulty with language [Behavior] : denies difficulty with behavior [Learning/Retaining New Information] : denies difficulty learning/retaining new information [Handling Complex Tasks] : denies difficulty handling complex tasks [Reasoning] : denies difficulty with reasoning [Spatial Ability and Orientation] : denies difficulty with spatial ability and orientation [High Risk Behavior] : no high risk behavior [Reports changes in hearing] : Reports no changes in hearing [Reports changes in vision] : Reports no changes in vision [Reports changes in dental health] : Reports no changes in dental health [Safety elements used in home] : no safety elements used in home [Sunscreen] : does not use sunscreen [MammogramComments] : n/a [PapSmearComments] : n/a [BoneDensityComments] : n/a [ColonoscopyComments] : n/a [AdvancecareDate] : 07/23

## 2023-07-18 NOTE — HISTORY OF PRESENT ILLNESS
[FreeTextEntry1] : Physical exam [de-identified] : 31 y/o male with PMHx significant for Depression, anxiety, mood swings, OCD presenting to the office for CPE. Pt complains of inability to attain an erection though his libido is not affected.

## 2023-07-18 NOTE — COUNSELING
[Fall prevention counseling provided] : Fall prevention counseling provided [Adequate lighting] : Adequate lighting [No throw rugs] : No throw rugs [Use proper foot wear] : Use proper foot wear [Use recommended devices] : Use recommended devices [Behavioral health counseling provided] : Behavioral health counseling provided [Sleep ___ hours/day] : Sleep [unfilled] hours/day [Engage in a relaxing activity] : Engage in a relaxing activity [Plan in advance] : Plan in advance [Yes] : Risk of tobacco use and health benefits of smoking cessation discussed: Yes [AUDIT-C Screening administered and reviewed] : AUDIT-C Screening administered and reviewed [Potential consequences of obesity discussed] : Potential consequences of obesity discussed [Benefits of weight loss discussed] : Benefits of weight loss discussed [Encouraged to increase physical activity] : Encouraged to increase physical activity [____ min/wk Activity] : [unfilled] min/wk activity [Patient motivation] : Patient motivation [Good understanding] : Patient has a good understanding of lifestyle changes and steps needed to achieve self management goal

## 2023-07-18 NOTE — ASSESSMENT
[FreeTextEntry1] : 31 y/o male with PMHx significant for Depression, anxiety, mood swings, OCD presenting to the office for CPE. Pt complains of inability to attain an erection though his libido is not affected. Pt also c/o chronic fatigue with not enough sleep.\par \par PSYCH: Longstanding h/o Depression, anxiety, OCD, lack of sleep\par -Pt non compliant with medication, states is looking to get Psychotherapist\par -Behavioral health referral provided\par -Will look for therapy before getting medicated\par -Sleep studies\par \par /ENDO: Erectile disfunction\par -Testosterone level normal back in February\par -Urology referral with Dr Mayo\par -Likely in setting of prolonged depression\par \par HCM:\par -Fasting blood work in house today\par -Permanently defers Flu vaccine\par -Hep C non reactive 11/22\par -HIV non reactive 5/20\par

## 2023-07-18 NOTE — HEALTH RISK ASSESSMENT
[Good] : ~his/her~ current health as good [Fair] :  ~his/her~ mood as fair [Never (0 pts)] : Never (0 points) [No] : In the past 12 months have you used drugs other than those required for medical reasons? No [No falls in past year] : Patient reported no falls in the past year [1] : 1) Little interest or pleasure doing things for several days (1) [3] : 2) Feeling down, depressed, or hopeless for nearly every day (3) [PHQ-2 Positive] : PHQ-2 Positive [Several Days (1)] : 1.) Little interest or pleasure in doing things? Several days [Nearly Every Day (3)] : 6.) Feeling bad about yourself, or that you are a failure, or have let yourself or your family down? Nearly every day [Not at All (0)] : 8.) Moving or speaking so slowly that other people could have noticed, or the opposite, moving or speaking faster than usual? Not at all [Moderately Severe] : severity of depression is moderately severe [Somewhat Difficult] : How difficult have these problems made it for you to do your work, take care of things at home, or get along with people? Somewhat difficult [HIV test declined] : HIV test declined [Hepatitis C test declined] : Hepatitis C test declined [None] : None [# of Members in Household ___] :  household currently consist of [unfilled] member(s) [Employed] : employed [Single] : single [Sexually Active] : sexually active [Feels Safe at Home] : Feels safe at home [Fully functional (bathing, dressing, toileting, transferring, walking, feeding)] : Fully functional (bathing, dressing, toileting, transferring, walking, feeding) [Fully functional (using the telephone, shopping, preparing meals, housekeeping, doing laundry, using] : Fully functional and needs no help or supervision to perform IADLs (using the telephone, shopping, preparing meals, housekeeping, doing laundry, using transportation, managing medications and managing finances) [Smoke Detector] : smoke detector [Carbon Monoxide Detector] : carbon monoxide detector [Seat Belt] :  uses seat belt [With Patient/Caregiver] : , with patient/caregiver [Reviewed updated] : Reviewed, updated [Aggressive treatment] : aggressive treatment [Never] : Never [Audit-CScore] : 0 [de-identified] : GYM, Running [de-identified] : regular [[id number]] : 4 [[id number]] : 16 [Change in mental status noted] : No change in mental status noted [Language] : denies difficulty with language [Behavior] : denies difficulty with behavior [Learning/Retaining New Information] : denies difficulty learning/retaining new information [Handling Complex Tasks] : denies difficulty handling complex tasks [Reasoning] : denies difficulty with reasoning [Spatial Ability and Orientation] : denies difficulty with spatial ability and orientation [High Risk Behavior] : no high risk behavior [Reports changes in hearing] : Reports no changes in hearing [Reports changes in vision] : Reports no changes in vision [Reports changes in dental health] : Reports no changes in dental health [Safety elements used in home] : no safety elements used in home [Sunscreen] : does not use sunscreen [MammogramComments] : n/a [PapSmearComments] : n/a [BoneDensityComments] : n/a [ColonoscopyComments] : n/a [AdvancecareDate] : 07/23

## 2023-07-18 NOTE — ASSESSMENT
[FreeTextEntry1] : 33 y/o male with PMHx significant for Depression, anxiety, mood swings, OCD presenting to the office for CPE. Pt complains of inability to attain an erection though his libido is not affected. Pt also c/o chronic fatigue with not enough sleep.\par \par PSYCH: Longstanding h/o Depression, anxiety, OCD, lack of sleep\par -Pt non compliant with medication, states is looking to get Psychotherapist\par -Behavioral health referral provided\par -Will look for therapy before getting medicated\par -Sleep studies\par \par /ENDO: Erectile disfunction\par -Testosterone level normal back in February\par -Urology referral with Dr Mayo\par -Likely in setting of prolonged depression\par \par HCM:\par -Fasting blood work in house today\par -Permanently defers Flu vaccine\par -Hep C non reactive 11/22\par -HIV non reactive 5/20\par

## 2023-07-18 NOTE — HISTORY OF PRESENT ILLNESS
[FreeTextEntry1] : Physical exam [de-identified] : 31 y/o male with PMHx significant for Depression, anxiety, mood swings, OCD presenting to the office for CPE. Pt complains of inability to attain an erection though his libido is not affected.

## 2023-07-20 LAB
ALBUMIN SERPL ELPH-MCNC: 4.9 G/DL
ALP BLD-CCNC: 78 U/L
ALT SERPL-CCNC: 12 U/L
ANION GAP SERPL CALC-SCNC: 11 MMOL/L
AST SERPL-CCNC: 14 U/L
BILIRUB SERPL-MCNC: 0.5 MG/DL
BUN SERPL-MCNC: 23 MG/DL
CALCIUM SERPL-MCNC: 10.1 MG/DL
CHLORIDE SERPL-SCNC: 104 MMOL/L
CO2 SERPL-SCNC: 27 MMOL/L
CREAT SERPL-MCNC: 0.92 MG/DL
EGFR: 115 ML/MIN/1.73M2
ESTIMATED AVERAGE GLUCOSE: 97 MG/DL
GLUCOSE SERPL-MCNC: 88 MG/DL
HBA1C MFR BLD HPLC: 5 %
HCT VFR BLD CALC: 46.9 %
HGB BLD-MCNC: 15 G/DL
LPL SERPL-CCNC: 19 U/L
MCHC RBC-ENTMCNC: 31.9 PG
MCHC RBC-ENTMCNC: 32 GM/DL
MCV RBC AUTO: 99.8 FL
PLATELET # BLD AUTO: 328 K/UL
POTASSIUM SERPL-SCNC: 4.3 MMOL/L
PROT SERPL-MCNC: 7.2 G/DL
RBC # BLD: 4.7 M/UL
RBC # FLD: 12.8 %
SODIUM SERPL-SCNC: 142 MMOL/L
TESTOST SERPL-MCNC: 462 NG/DL
TSH SERPL-ACNC: 0.81 UIU/ML
WBC # FLD AUTO: 8.97 K/UL

## 2023-09-08 ENCOUNTER — APPOINTMENT (OUTPATIENT)
Dept: FAMILY MEDICINE | Facility: CLINIC | Age: 33
End: 2023-09-08

## 2023-12-18 ENCOUNTER — APPOINTMENT (OUTPATIENT)
Dept: FAMILY MEDICINE | Facility: CLINIC | Age: 33
End: 2023-12-18

## 2024-02-08 ENCOUNTER — APPOINTMENT (OUTPATIENT)
Dept: FAMILY MEDICINE | Facility: CLINIC | Age: 34
End: 2024-02-08

## 2024-04-02 ENCOUNTER — APPOINTMENT (OUTPATIENT)
Dept: FAMILY MEDICINE | Facility: CLINIC | Age: 34
End: 2024-04-02

## 2024-05-02 ENCOUNTER — APPOINTMENT (OUTPATIENT)
Dept: UROLOGY | Facility: CLINIC | Age: 34
End: 2024-05-02
Payer: MEDICAID

## 2024-05-02 VITALS — WEIGHT: 155 LBS | HEIGHT: 69 IN | BODY MASS INDEX: 22.96 KG/M2

## 2024-05-02 DIAGNOSIS — N46.9 MALE INFERTILITY, UNSPECIFIED: ICD-10-CM

## 2024-05-02 DIAGNOSIS — N52.9 MALE ERECTILE DYSFUNCTION, UNSPECIFIED: ICD-10-CM

## 2024-05-02 DIAGNOSIS — Z87.440 PERSONAL HISTORY OF URINARY (TRACT) INFECTIONS: ICD-10-CM

## 2024-05-02 LAB
BILIRUB UR QL STRIP: NORMAL
CLARITY UR: CLEAR
COLLECTION METHOD: NORMAL
GLUCOSE UR-MCNC: NORMAL
HCG UR QL: 1 EU/DL
HGB UR QL STRIP.AUTO: NORMAL
KETONES UR-MCNC: NORMAL
LEUKOCYTE ESTERASE UR QL STRIP: NORMAL
NITRITE UR QL STRIP: NORMAL
PH UR STRIP: 6.5
PROT UR STRIP-MCNC: NORMAL
SP GR UR STRIP: 1.02

## 2024-05-02 PROCEDURE — 99203 OFFICE O/P NEW LOW 30 MIN: CPT | Mod: 25

## 2024-05-02 NOTE — PHYSICAL EXAM
[General Appearance - Well Developed] : well developed [General Appearance - Well Nourished] : well nourished [General Appearance - In No Acute Distress] : no acute distress [] : no respiratory distress [Urethral Meatus] : meatus normal [Penis Abnormality] : normal circumcised penis [Epididymis] : the epididymides were normal [Testes Tenderness] : no tenderness of the testes [Testes Mass (___cm)] : there were no testicular masses [Oriented To Time, Place, And Person] : oriented to person, place, and time

## 2024-05-25 PROBLEM — N52.9 MALE ERECTILE DISORDER: Status: ACTIVE | Noted: 2023-03-17

## 2024-05-25 NOTE — HISTORY OF PRESENT ILLNESS
[Urinary Frequency] : urinary frequency [FreeTextEntry1] : c/o "erectile dysfunction, UTI, low testosterone and frequent urination at night.  Unable to conceive, trying > 6 mos. [Nocturia] : nocturia [Straining] : straining [Intermittency] : intermittency [Erectile Dysfunction] : Erectile Dysfunction [Dysuria] : no dysuria [Hematuria - Gross] : no gross hematuria [None] : None

## 2024-06-25 ENCOUNTER — APPOINTMENT (OUTPATIENT)
Dept: UROLOGY | Facility: CLINIC | Age: 34
End: 2024-06-25

## 2024-07-30 ENCOUNTER — APPOINTMENT (OUTPATIENT)
Dept: FAMILY MEDICINE | Facility: CLINIC | Age: 34
End: 2024-07-30

## 2024-10-31 NOTE — ASSESSMENT
-sx onset 10/17  -received remdesevir x2 in hospital  -has residual cough with normal lung exam  -recommend starting vitamin D3 2000units daily, vitamin C 500mg daily and zinc 50mg daily for 30d  -consider COVID vaccine 90d post acute infection        [FreeTextEntry1] : This is a 28 y/o male with PMHx significant for Anxiety, Depression, Hypogonadism, OCD presenting to the office for CPE.\par \par PSYCH: h/o Depression, anxiety\par -Continue Sertraline 100 mg daily\par -Psychiatry referral\par -If any suicidal thoughts or intent I have urged the patient to go to the nearest ER\par \par ENDO: Low testosterone / hypogonadism\par -Endocrinology follow up with Dr Luevano.\par -Continue off Testosterone supplements.\par \par HCM:\par -Covid-19 Ab negative\par -Diet and exercise\par -Hep C and HIV testing verbally consented, both non-reactive 5/2020.\par -Psych referral given.